# Patient Record
Sex: FEMALE | Race: WHITE | NOT HISPANIC OR LATINO
[De-identification: names, ages, dates, MRNs, and addresses within clinical notes are randomized per-mention and may not be internally consistent; named-entity substitution may affect disease eponyms.]

---

## 2018-01-09 ENCOUNTER — RESULT REVIEW (OUTPATIENT)
Age: 23
End: 2018-01-09

## 2021-03-09 ENCOUNTER — OUTPATIENT (OUTPATIENT)
Dept: OUTPATIENT SERVICES | Facility: HOSPITAL | Age: 26
LOS: 1 days | Discharge: STILL ADMITTED OR ADMINISTRATIVE | End: 2021-03-09
Payer: COMMERCIAL

## 2021-03-09 DIAGNOSIS — F29 UNSPECIFIED PSYCHOSIS NOT DUE TO A SUBSTANCE OR KNOWN PHYSIOLOGICAL CONDITION: ICD-10-CM

## 2021-03-09 DIAGNOSIS — F25.9 SCHIZOAFFECTIVE DISORDER, UNSPECIFIED: ICD-10-CM

## 2021-03-09 DIAGNOSIS — F33.9 MAJOR DEPRESSIVE DISORDER, RECURRENT, UNSPECIFIED: ICD-10-CM

## 2021-03-09 PROCEDURE — 90839 PSYTX CRISIS INITIAL 60 MIN: CPT

## 2021-03-19 PROCEDURE — 99214 OFFICE O/P EST MOD 30 MIN: CPT | Mod: 95

## 2021-03-26 PROCEDURE — 99214 OFFICE O/P EST MOD 30 MIN: CPT | Mod: 95

## 2021-03-29 ENCOUNTER — OUTPATIENT (OUTPATIENT)
Dept: OUTPATIENT SERVICES | Facility: HOSPITAL | Age: 26
LOS: 1 days | Discharge: HOME | End: 2021-03-29
Payer: MEDICAID

## 2021-03-29 PROCEDURE — 90791 PSYCH DIAGNOSTIC EVALUATION: CPT | Mod: 95

## 2021-03-30 DIAGNOSIS — F33.9 MAJOR DEPRESSIVE DISORDER, RECURRENT, UNSPECIFIED: ICD-10-CM

## 2021-04-14 PROCEDURE — 99214 OFFICE O/P EST MOD 30 MIN: CPT | Mod: 95

## 2021-05-19 PROCEDURE — 99442: CPT

## 2021-06-09 PROCEDURE — 99214 OFFICE O/P EST MOD 30 MIN: CPT | Mod: 95

## 2021-07-07 PROCEDURE — 99214 OFFICE O/P EST MOD 30 MIN: CPT | Mod: 95

## 2021-07-28 PROCEDURE — 99214 OFFICE O/P EST MOD 30 MIN: CPT | Mod: 95

## 2021-08-25 PROCEDURE — 99214 OFFICE O/P EST MOD 30 MIN: CPT | Mod: 95

## 2021-09-29 PROCEDURE — 99442: CPT

## 2021-11-04 PROCEDURE — 99214 OFFICE O/P EST MOD 30 MIN: CPT | Mod: 95

## 2021-11-30 PROCEDURE — 99214 OFFICE O/P EST MOD 30 MIN: CPT | Mod: 95

## 2022-01-07 PROCEDURE — 99443: CPT

## 2022-02-10 PROCEDURE — 99214 OFFICE O/P EST MOD 30 MIN: CPT | Mod: 95

## 2022-04-06 PROCEDURE — 99214 OFFICE O/P EST MOD 30 MIN: CPT | Mod: 95

## 2022-05-11 PROCEDURE — 99214 OFFICE O/P EST MOD 30 MIN: CPT | Mod: 95

## 2022-07-28 PROCEDURE — 99214 OFFICE O/P EST MOD 30 MIN: CPT | Mod: 95

## 2022-08-29 PROCEDURE — 99214 OFFICE O/P EST MOD 30 MIN: CPT | Mod: 95

## 2023-05-01 ENCOUNTER — OUTPATIENT (OUTPATIENT)
Dept: OUTPATIENT SERVICES | Facility: HOSPITAL | Age: 28
LOS: 1 days | Discharge: STILL ADMITTED OR ADMINISTRATIVE | End: 2023-05-01
Payer: MEDICAID

## 2023-05-01 PROCEDURE — 99214 OFFICE O/P EST MOD 30 MIN: CPT | Mod: 95

## 2023-05-02 DIAGNOSIS — F33.9 MAJOR DEPRESSIVE DISORDER, RECURRENT, UNSPECIFIED: ICD-10-CM

## 2024-11-03 ENCOUNTER — INPATIENT (INPATIENT)
Facility: HOSPITAL | Age: 29
LOS: 9 days | Discharge: HOME | End: 2024-11-13
Attending: PSYCHIATRY & NEUROLOGY | Admitting: PSYCHIATRY & NEUROLOGY
Payer: MEDICAID

## 2024-11-03 VITALS
SYSTOLIC BLOOD PRESSURE: 116 MMHG | HEART RATE: 87 BPM | RESPIRATION RATE: 16 BRPM | WEIGHT: 188.94 LBS | OXYGEN SATURATION: 100 % | TEMPERATURE: 99 F | HEIGHT: 69 IN | DIASTOLIC BLOOD PRESSURE: 73 MMHG

## 2024-11-03 DIAGNOSIS — F41.1 GENERALIZED ANXIETY DISORDER: ICD-10-CM

## 2024-11-03 DIAGNOSIS — F32.9 MAJOR DEPRESSIVE DISORDER, SINGLE EPISODE, UNSPECIFIED: ICD-10-CM

## 2024-11-03 LAB
ALBUMIN SERPL ELPH-MCNC: 4.4 G/DL — SIGNIFICANT CHANGE UP (ref 3.3–5)
ALP SERPL-CCNC: 50 U/L — SIGNIFICANT CHANGE UP (ref 40–120)
ALT FLD-CCNC: 38 U/L — HIGH (ref 4–33)
AMPHET UR-MCNC: NEGATIVE — SIGNIFICANT CHANGE UP
ANION GAP SERPL CALC-SCNC: 15 MMOL/L — HIGH (ref 7–14)
APAP SERPL-MCNC: <10 UG/ML — LOW (ref 15–25)
APPEARANCE UR: CLEAR — SIGNIFICANT CHANGE UP
AST SERPL-CCNC: 31 U/L — SIGNIFICANT CHANGE UP (ref 4–32)
BACTERIA # UR AUTO: ABNORMAL /HPF
BARBITURATES UR SCN-MCNC: NEGATIVE — SIGNIFICANT CHANGE UP
BASOPHILS # BLD AUTO: 0.07 K/UL — SIGNIFICANT CHANGE UP (ref 0–0.2)
BASOPHILS NFR BLD AUTO: 1.1 % — SIGNIFICANT CHANGE UP (ref 0–2)
BENZODIAZ UR-MCNC: NEGATIVE — SIGNIFICANT CHANGE UP
BILIRUB SERPL-MCNC: 0.3 MG/DL — SIGNIFICANT CHANGE UP (ref 0.2–1.2)
BILIRUB UR-MCNC: NEGATIVE — SIGNIFICANT CHANGE UP
BUN SERPL-MCNC: 9 MG/DL — SIGNIFICANT CHANGE UP (ref 7–23)
CALCIUM SERPL-MCNC: 8.9 MG/DL — SIGNIFICANT CHANGE UP (ref 8.4–10.5)
CAST: 0 /LPF — SIGNIFICANT CHANGE UP (ref 0–4)
CHLORIDE SERPL-SCNC: 104 MMOL/L — SIGNIFICANT CHANGE UP (ref 98–107)
CO2 SERPL-SCNC: 21 MMOL/L — LOW (ref 22–31)
COCAINE METAB.OTHER UR-MCNC: NEGATIVE — SIGNIFICANT CHANGE UP
COLOR SPEC: YELLOW — SIGNIFICANT CHANGE UP
CREAT SERPL-MCNC: 0.53 MG/DL — SIGNIFICANT CHANGE UP (ref 0.5–1.3)
CREATININE URINE RESULT, DAU: 52 MG/DL — SIGNIFICANT CHANGE UP
DIFF PNL FLD: NEGATIVE — SIGNIFICANT CHANGE UP
EGFR: 128 ML/MIN/1.73M2 — SIGNIFICANT CHANGE UP
EOSINOPHIL # BLD AUTO: 0.14 K/UL — SIGNIFICANT CHANGE UP (ref 0–0.5)
EOSINOPHIL NFR BLD AUTO: 2.2 % — SIGNIFICANT CHANGE UP (ref 0–6)
ETHANOL SERPL-MCNC: <10 MG/DL — SIGNIFICANT CHANGE UP
FENTANYL UR QL SCN: NEGATIVE — SIGNIFICANT CHANGE UP
GLUCOSE SERPL-MCNC: 84 MG/DL — SIGNIFICANT CHANGE UP (ref 70–99)
GLUCOSE UR QL: NEGATIVE MG/DL — SIGNIFICANT CHANGE UP
HCG SERPL-ACNC: <1 MIU/ML — SIGNIFICANT CHANGE UP
HCT VFR BLD CALC: 38.8 % — SIGNIFICANT CHANGE UP (ref 34.5–45)
HGB BLD-MCNC: 12.9 G/DL — SIGNIFICANT CHANGE UP (ref 11.5–15.5)
IANC: 3.38 K/UL — SIGNIFICANT CHANGE UP (ref 1.8–7.4)
IMM GRANULOCYTES NFR BLD AUTO: 0.2 % — SIGNIFICANT CHANGE UP (ref 0–0.9)
KETONES UR-MCNC: NEGATIVE MG/DL — SIGNIFICANT CHANGE UP
LEUKOCYTE ESTERASE UR-ACNC: ABNORMAL
LYMPHOCYTES # BLD AUTO: 2.16 K/UL — SIGNIFICANT CHANGE UP (ref 1–3.3)
LYMPHOCYTES # BLD AUTO: 34 % — SIGNIFICANT CHANGE UP (ref 13–44)
MCHC RBC-ENTMCNC: 28.7 PG — SIGNIFICANT CHANGE UP (ref 27–34)
MCHC RBC-ENTMCNC: 33.2 G/DL — SIGNIFICANT CHANGE UP (ref 32–36)
MCV RBC AUTO: 86.4 FL — SIGNIFICANT CHANGE UP (ref 80–100)
METHADONE UR-MCNC: NEGATIVE — SIGNIFICANT CHANGE UP
MONOCYTES # BLD AUTO: 0.59 K/UL — SIGNIFICANT CHANGE UP (ref 0–0.9)
MONOCYTES NFR BLD AUTO: 9.3 % — SIGNIFICANT CHANGE UP (ref 2–14)
NEUTROPHILS # BLD AUTO: 3.38 K/UL — SIGNIFICANT CHANGE UP (ref 1.8–7.4)
NEUTROPHILS NFR BLD AUTO: 53.2 % — SIGNIFICANT CHANGE UP (ref 43–77)
NITRITE UR-MCNC: NEGATIVE — SIGNIFICANT CHANGE UP
NRBC # BLD: 0 /100 WBCS — SIGNIFICANT CHANGE UP (ref 0–0)
NRBC # FLD: 0 K/UL — SIGNIFICANT CHANGE UP (ref 0–0)
OPIATES UR-MCNC: NEGATIVE — SIGNIFICANT CHANGE UP
OXYCODONE UR-MCNC: NEGATIVE — SIGNIFICANT CHANGE UP
PCP SPEC-MCNC: SIGNIFICANT CHANGE UP
PCP UR-MCNC: NEGATIVE — SIGNIFICANT CHANGE UP
PH UR: 7 — SIGNIFICANT CHANGE UP (ref 5–8)
PLATELET # BLD AUTO: 333 K/UL — SIGNIFICANT CHANGE UP (ref 150–400)
POTASSIUM SERPL-MCNC: 3.7 MMOL/L — SIGNIFICANT CHANGE UP (ref 3.5–5.3)
POTASSIUM SERPL-SCNC: 3.7 MMOL/L — SIGNIFICANT CHANGE UP (ref 3.5–5.3)
PROT SERPL-MCNC: 7 G/DL — SIGNIFICANT CHANGE UP (ref 6–8.3)
PROT UR-MCNC: NEGATIVE MG/DL — SIGNIFICANT CHANGE UP
RBC # BLD: 4.49 M/UL — SIGNIFICANT CHANGE UP (ref 3.8–5.2)
RBC # FLD: 11.9 % — SIGNIFICANT CHANGE UP (ref 10.3–14.5)
RBC CASTS # UR COMP ASSIST: 2 /HPF — SIGNIFICANT CHANGE UP (ref 0–4)
REVIEW: SIGNIFICANT CHANGE UP
SALICYLATES SERPL-MCNC: <0.3 MG/DL — LOW (ref 15–30)
SARS-COV-2 RNA SPEC QL NAA+PROBE: SIGNIFICANT CHANGE UP
SODIUM SERPL-SCNC: 140 MMOL/L — SIGNIFICANT CHANGE UP (ref 135–145)
SP GR SPEC: 1.01 — SIGNIFICANT CHANGE UP (ref 1–1.03)
SQUAMOUS # UR AUTO: 1 /HPF — SIGNIFICANT CHANGE UP (ref 0–5)
THC UR QL: POSITIVE
TOXICOLOGY SCREEN, DRUGS OF ABUSE, SERUM RESULT: SIGNIFICANT CHANGE UP
TSH SERPL-MCNC: 8.57 UIU/ML — HIGH (ref 0.27–4.2)
UROBILINOGEN FLD QL: 0.2 MG/DL — SIGNIFICANT CHANGE UP (ref 0.2–1)
WBC # BLD: 6.35 K/UL — SIGNIFICANT CHANGE UP (ref 3.8–10.5)
WBC # FLD AUTO: 6.35 K/UL — SIGNIFICANT CHANGE UP (ref 3.8–10.5)
WBC UR QL: 2 /HPF — SIGNIFICANT CHANGE UP (ref 0–5)

## 2024-11-03 PROCEDURE — 99285 EMERGENCY DEPT VISIT HI MDM: CPT

## 2024-11-03 NOTE — ED ADULT NURSE NOTE - NSFALLUNIVINTERV_ED_ALL_ED
Bed/Stretcher in lowest position, wheels locked, appropriate side rails in place/Call bell, personal items and telephone in reach/Instruct patient to call for assistance before getting out of bed/chair/stretcher/Non-slip footwear applied when patient is off stretcher/[place] to call system/Physically safe environment - no spills, clutter or unnecessary equipment/Purposeful proactive rounding/Room/bathroom lighting operational, light cord in reach

## 2024-11-03 NOTE — ED PROVIDER NOTE - CLINICAL SUMMARY MEDICAL DECISION MAKING FREE TEXT BOX
30 yo F PMH 30 yo F PMH MDD, HLD p/w increased delusions believing her friends are wizards and trying to hurt her and attack her. Admits to having SI related to panic attacks. Patient has been sending multiple nonstop text messages to her friend that is disorganized and rambling. Patient started an improv class and all of her friends have blocked her due to the harassing messages. Called her friend 40 times asking if her friend is dead. Admits to having racing and repetitive thoughts. Denies fever, headache, dizziness, HI/AH/VH, chills, chest pain, shortness of breath, abdominal pain, sick contact or recent travel. Denies alcohol use or other drugs.    Labs, EKG, COVID  Psych consult  Dispo as per consult

## 2024-11-03 NOTE — ED BEHAVIORAL HEALTH ASSESSMENT NOTE - DETAILS
reports suicidal ideation on Saturday, no plan or intent - passive suicidal ideation in the past father - bipolar disorder, paternal grandparent schizophrenia, mother depressed, sister anxiety mother bryson

## 2024-11-03 NOTE — ED ADULT TRIAGE NOTE - CHIEF COMPLAINT QUOTE
Patient states feeling depressed, suicidal with no plan. No homicidal ideation. Per mom, patient having hallucinations about wizards trying to harm her. History of depression. Takes Venlafaxine. Mom, Carrie Mason, [phone].

## 2024-11-03 NOTE — ED BEHAVIORAL HEALTH NOTE - BEHAVIORAL HEALTH NOTE
AS PER PSYCKES:    Diagnoses Past Year  Behavioral Health (2)  Most Recent:Major Depressive Disorder • Phobia-Agoraphobia  Most Frequent (# of services):Major Depressive Disorder(8) • Phobia-Agoraphobia(8)  Medical (9)  5 Most Recent:Acne • Rosacea • Vasomotor and allergic rhinitis • Unspecified mycosis • Conjunctivitis ...  5 Most Frequent (# of services):Acne(3) • Rosacea(3) • Cutaneous abscess, furuncle and carbuncle(2) • Vasomotor and allergic rhinitis(1) • Unspecified mycosis(1) ...    Medications Past YearLast   Venlafaxine Hcl (Venlafaxine Hcl) • Antidepressant9/27/2024Dose: 100 MG, 2/day • Quantity: 60  Cetirizine Hcl (Cetirizine Hcl) • Antihistamines - Non-Sedating7/3/2024Dose: 10 MG, 1/day • Quantity: 30  Pimecrolimus (Pimecrolimus) • Immunosuppressive Agents - Topical6/27/2024Dose: 1 %, 1/day • Quantity: 30  Spironolactone (Spironolactone) • Potassium Sparing Diuretics6/27/2024Dose: 50 MG, 1/day • Quantity: 90  Azelaic Acid (Azelaic Acid) • Rosacea Agents5/25/2024Dose: 15 %, 1.67/day • Quantity: 50    All Hospital and Crisis Utilization • 5 Years  ER Visits# ProvidersLast ER Visit  No Medicaid claims for this data type in the past 5 years  Inpatient Admissions# ProvidersLast Inpatient Admission  No Medicaid claims for this data type in the past 5 years  Crisis Services# ProvidersLast Crisis Service  No Medicaid claims for this data type in the past 5 years    Outpatient Providers Past YearLast Service Date & Type  [facility]8/15/2024Multi-Type Group (Telehealth)  [facility]6/27/2024Prescriber - [facility]5/1/2024Multi-Type Group - Internal Medicine  AGUSTO SHAHID ANN4/15/2024Physician - Obstetrics and Gynecology

## 2024-11-03 NOTE — ED ADULT NURSE NOTE - CHIEF COMPLAINT QUOTE
Patient states feeling depressed, suicidal with no plan. No homicidal ideation. Per mom, patient having hallucinations about wizards trying to harm her. History of depression. Takes Venlafaxine. Mom, Carrie Mason, [phone].

## 2024-11-03 NOTE — ED BEHAVIORAL HEALTH ASSESSMENT NOTE - SUMMARY
Patient is a 29 year old, female; domicile alone; single; noncaregiver; unemployed ; PPH of depression and anxiety; no hospitalizations; no known suicide attempts; no known history of violence or arrests; active Cannabis abuse; known history of complicated withdrawal; PMH unremarkable; brought in by mother; for psychiatric evaluation.    Patient seen and evaluated, She present depressed and anxious despite compliance with medication. Collateral from mother suggest patient was suffering from psychotic symptoms yesterday however patient does not appear psychotic at this time. Cannot rule out substance induced psychosis. Patient meets criteria for major depressive disorder and Generalized Anxiety Disorder. Discussed benefits of inpatient admission and patient is in agreement. Patient will be admitted to [facility] on a voluntary status. Patient is able to notify staff for worsening symptoms and does not require constant observation. Patient is a 29 year old, female; domicile alone; single; noncaregiver; unemployed ; PPH of depression and anxiety; no hospitalizations; no known suicide attempts; no known history of violence or arrests; active Cannabis abuse; known history of complicated withdrawal; PMH unremarkable; brought in by mother; for psychiatric evaluation.    Patient seen and evaluated, She present depressed and anxious despite compliance with medication. Collateral from mother suggest patient was suffering from psychotic symptoms yesterday however patient does not appear psychotic at this time. Cannot rule out substance induced psychosis. Patient meets criteria for major depressive disorder and Generalized Anxiety Disorder. Discussed benefits of inpatient admission and patient is in agreement. [facility] once medically cleared.  Patient is able to notify staff for worsening symptoms and does not require constant observation.   Discussed elevated TSH 8.57 with medical NP- will check T3, T4 and consult endo. Patient is a 29 year old, female; domicile alone; single; noncaregiver; unemployed ; PPH of depression and anxiety; no hospitalizations; no known suicide attempts; no known history of violence or arrests; active Cannabis abuse; known history of complicated withdrawal; PMH unremarkable; brought in by mother; for psychiatric evaluation.    Patient seen and evaluated, She present depressed and anxious despite compliance with medication. Collateral from mother suggest patient was suffering from psychotic symptoms yesterday however patient does not appear psychotic at this time. Cannot rule out substance induced psychosis. Patient meets criteria for major depressive disorder and Generalized Anxiety Disorder. Discussed benefits of inpatient admission and patient is in agreement. Patient will be admitted to [facility] on a voluntary status once medically cleared.  Patient is able to notify staff for worsening symptoms and does not require constant observation.   Discussed elevated TSH 8.57 with medical NP- will check T3 and T4. -

## 2024-11-03 NOTE — ED BEHAVIORAL HEALTH ASSESSMENT NOTE - ABORTED (SELF-INTERRUPTED) ATTEMPT:
Patient sent to [facility] for stimulant No Psych Dx. Still with extreme fatigue.   I will defer back to Rheum.  I do not have a qualifying Dx from Primary Care to utilize stimulants, and referred as recommended by staff MD.   Please contact patient for additional steps for his fatigue, and his pending results not in EPIC None known

## 2024-11-03 NOTE — ED BEHAVIORAL HEALTH ASSESSMENT NOTE - PSYCHIATRIC ISSUES AND PLAN (INCLUDE STANDING AND PRN MEDICATION)
Will defer standing treatment to treatment team, Ativan 2 mg Haldol 2 mg po/im q6h prn agitation Will defer changing standing treatment to treatment team, Ativan 2 mg po/im q6h prn agitation

## 2024-11-03 NOTE — ED PROVIDER NOTE - NSFOLLOWUPINSTRUCTIONS_ED_ALL_ED_FT
Follow up with your PMD within 48-72 hrs. Show copies of your reports given to you.   Worsening, continued or new concerning symptoms return to the emergency department.    You have been given information necessary to follow up with the  [facility] ([facility]) Crisis center & other outpatient  psychiatric clinics within your community    • [facility] walk in Crisis centre  [address] [place] [address]  [phone] [email or web address]  Hours of operation:  Day	                                        Hours  Sunday                                  Closed  Monday                                9am - 3pm  Tuesday                                9am - 3pm  Wednesday                          9am - 3pm  Thursday                               9am - 3pm  Friday                                    9am - 3pm  Saturday                                Closed

## 2024-11-03 NOTE — ED ADULT NURSE REASSESSMENT NOTE - NS ED NURSE REASSESS COMMENT FT1
Report given by previous RN, Pt breathing is equal and nonlabored. Pt is not in any distress or discomfort. pt calm and cooperative at this time. Pt safety maintained.

## 2024-11-03 NOTE — ED BEHAVIORAL HEALTH ASSESSMENT NOTE - NSBHATTESTCOMMENTATTENDFT_PSY_A_CORE
Patient is a 29 year old, female; domicile alone; single; noncaregiver; unemployed ; PPH of depression and anxiety; no hospitalizations; no known suicide attempts; no known history of violence or arrests; active Cannabis abuse; known history of complicated withdrawal; PMH unremarkable; brought in by mother; for psychiatric evaluation.    Patient presents anxious, and depressed mood, tearful at times . She denies any psychotic sx including ah/vh or paranoia. Patient is not suicidal currently, in spite of collateral. Patient is seeking voluntary admission for medications adjustment.

## 2024-11-03 NOTE — ED BEHAVIORAL HEALTH ASSESSMENT NOTE - RISK ASSESSMENT
risk- recent suicidal ideation, substance abuse  protective- supportive family, no prior attempts, futuristic, denies NSSIB

## 2024-11-03 NOTE — ED BEHAVIORAL HEALTH ASSESSMENT NOTE - DESCRIPTION
cooperative   ICU Vital Signs Last 24 Hrs  T(C): 36.7 (03 Nov 2024 19:09), Max: 37.1 (03 Nov 2024 16:56)  T(F): 98 (03 Nov 2024 19:09), Max: 98.8 (03 Nov 2024 16:56)  HR: 82 (03 Nov 2024 19:09) (82 - 87)  BP: 115/71 (03 Nov 2024 19:09) (115/71 - 116/73)  BP(mean): --  ABP: --  ABP(mean): --  RR: 18 (03 Nov 2024 19:09) (16 - 18)  SpO2: 100% (03 Nov 2024 19:09) (100% - 100%)    O2 Parameters below as of 03 Nov 2024 19:09  Patient On (Oxygen Delivery Method): room air Lives alone in [place], unemployed, parents live in [place], graduated [employer] denies

## 2024-11-03 NOTE — ED BEHAVIORAL HEALTH ASSESSMENT NOTE - NSBHATTESTAPPAMEND_PSY_A_CORE
I have personally seen and examined this patient. I fully participated in the care of this patient. I have made amendments to the documentation where appropriate and otherwise agree with the history, physical exam, and plan as documented by the SHARLA

## 2024-11-03 NOTE — ED BEHAVIORAL HEALTH ASSESSMENT NOTE - HPI (INCLUDE ILLNESS QUALITY, SEVERITY, DURATION, TIMING, CONTEXT, MODIFYING FACTORS, ASSOCIATED SIGNS AND SYMPTOMS)
Patient is a 29 year old, female; domicile alone; single; noncaregiver; unemployed ; PPH of depression and anxiety; no hospitalizations; no known suicide attempts; no known history of violence or arrests; active Cannabis abuse; known history of complicated withdrawal; PMH unremarkable; brought in by mother; for psychiatric evaluation.      Patient seen and evaluated. 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 On Saturday patient sent text messages to several friends that did make sense. Patient remembers texting her friends but does not remember what she said. Patient was told her  friends reached out to her mother and patient's mother drove to [place] and brought patient back to her parents home in [place]. Patient then went to her class today in [place] and re turned home to her apartment in [place]. Upon return patient felt extremenly anxious and depressed and agreed to come to the hospital. Current depressive symptoms include depressed mood most days of the week, anhedonia ( no longer enjoys socializing), low energy, more concentration and memory and suicidal ideation ( denies plan or intent). Patient also reports excessive anxiety and worry occurring more days than not for at least 6 months, about a number of events or activities and finds it difficult to control the worry.  Anxiety and worry are associated with restlessness', being easily fatigued, difficulty concentrating, racing thoughts. and sleep disturbances.  Patient is in current treatment at  [facility] in [place] and prescribed Venlafaxine 200 mg daily. She reports she is compliant with medication but feels it is not working. Patient reports using Cannabis daily which she feels helps her anxiety. She denies other substances of abuse.  Received collateral from mother, who dove patient to the ED. On Saturday patient was "irrational and texting messages to friends that did not make sense." Patient sister received a message from one of patients friends who was concerned. Mother drove to patient's apartment and brought her back to [place] that evening. Patient was acting bizarre with repetitive speech and not making sense. Mother suspected patient was taking mushrooms, because she has in the past, and thought the effects of the drug would be gone by the morning. This morning patient appeared " 90 percent better" and went to class. Mother went to patient apartment this evening and found patient tearful and smoking cannabis. Patient reported to mother that her instructor told her not to come back to class until she improved mentally and patients friend at school blocked her calls. Patient agreed to come to the ED. Patient has no suicide attempt however on Saturday patient stated that she wanted to die. As far as other psychiatric symptoms, patient reports feeling paranoid with thoughts that random people are out to get her and over the past several days has felt unsafe when she goes outside. Patient denies current or recent auditory/visual hallucinations and denies ideas of reference. Patient also denies Patient is a 29 year old, female; domicile alone; single; noncaregiver; unemployed ; PPH of depression and anxiety; no hospitalizations; no known suicide attempts; no known history of violence or arrests; active Cannabis abuse; known history of complicated withdrawal; PMH unremarkable; brought in by mother; for psychiatric evaluation.      Patient seen and evaluated. 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 On Saturday patient sent text messages to several friends that did make sense. Patient remembers texting her friends but does not remember what she said. Patient was told her  friends reached out to her mother and patient's mother drove to [place] and brought patient back to her parents home in [place]. Patient then went to her class today in [place] and re turned home to her apartment in [place]. Upon return patient felt extremely anxious and depressed and agreed to come to the hospital. Current depressive symptoms include depressed mood most days of the week, anhedonia ( no longer enjoys socializing), low energy, more concentration and memory and suicidal ideation ( denies plan or intent). Patient also reports excessive anxiety and worry occurring more days than not for at least 6 months, about a number of events or activities and finds it difficult to control the worry.  Anxiety and worry are associated with restlessness', being easily fatigued, difficulty concentrating, racing thoughts. and sleep disturbances.  Patient is in current treatment at  [facility] in [place] and prescribed Venlafaxine 200 mg daily. She reports she is compliant with medication but feels it is not working. Patient reports using Cannabis daily which she feels helps her anxiety. She denies other substances of abuse.  Received collateral from mother, who dove patient to the ED. On Saturday patient was "irrational and texting messages to friends that did not make sense." Patient sister received a message from one of patients friends who was concerned. Mother drove to patient's apartment and brought her back to [place] that evening. Patient was acting bizarre with repetitive speech and not making sense. Mother suspected patient was taking mushrooms, because she has in the past, and thought the effects of the drug would be gone by the morning. This morning patient appeared " 90 percent better" and went to class. Mother went to patient apartment this evening and found patient tearful and smoking cannabis. Patient reported to mother that her instructor told her not to come back to class until she improved mentally and patients friend at school blocked her calls. Patient agreed to come to the ED. Patient has no suicide attempt however on Saturday patient stated that she wanted to die. As far as other psychiatric symptoms, patient reports feeling paranoid with thoughts that random people are out to get her and over the past several days has felt unsafe when she goes outside. Patient denies current or recent auditory/visual hallucinations and denies ideas of reference. Patient also denies Patient is a 29 year old, female; domicile alone; single; noncaregiver; unemployed ; PPH of depression and anxiety; no hospitalizations; no known suicide attempts; no known history of violence or arrests; active Cannabis abuse; known history of complicated withdrawal; PMH unremarkable; brought in by mother; for psychiatric evaluation.      Patient seen and evaluated. 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 On Saturday patient sent text messages to several friends that didn't make sense. Patient remembers texting her friends but does not remember what she said. Patient was told her  friends reached out to her mother and patient's mother drove to [place] and brought patient back to her parents home in [place]. Patient then went to her class today in [place] and re turned home to her apartment in [place]. Upon return patient felt extremely anxious and depressed and agreed to come to the hospital. Current depressive symptoms include depressed mood most days of the week, anhedonia ( no longer enjoys socializing), low energy, more concentration and memory and suicidal ideation ( denies plan or intent). Patient also reports excessive anxiety and worry occurring more days than not for at least 6 months, about a number of events or activities and finds it difficult to control the worry.  Anxiety and worry are associated with restlessness', being easily fatigued, difficulty concentrating, racing thoughts. and sleep disturbances.  Patient is in current treatment at  [facility] in [place] and prescribed Venlafaxine 200 mg daily. She reports she is compliant with medication but feels it is not working. Patient reports using Cannabis daily which she feels helps her anxiety. She denies other substances of abuse.  Received collateral from mother, who dove patient to the ED. On Saturday patient was "irrational and texting messages to friends that did not make sense." Patient sister received a message from one of patients friends who was concerned. Mother drove to patient's apartment and brought her back to [place] that evening. Patient was acting bizarre with repetitive speech and not making sense. Mother suspected patient was taking mushrooms, because she has in the past, and thought the effects of the drug would be gone by the morning. This morning patient appeared " 90 percent better" and went to class. Mother went to patient apartment this evening and found patient tearful and smoking cannabis. Patient reported to mother that her instructor told her not to come back to class until she improved mentally and patients friend at school blocked her calls. Patient agreed to come to the ED. Patient has no suicide attempt however on Saturday patient stated that she wanted to die. As far as other psychiatric symptoms, patient reports feeling paranoid with thoughts that random people are out to get her and over the past several days has felt unsafe when she goes outside. Patient denies current or recent auditory/visual hallucinations and denies ideas of reference. Patient also denies

## 2024-11-03 NOTE — ED ADULT NURSE NOTE - OBJECTIVE STATEMENT
BREAK RN: pt. received to  from walk in triage brought in by mother presenting for a psych eval. As per triage note, pt. has been endorsing S/I with no plan, as well as auditory hallucinations of wizards telling her what to do. Upon initial interview, pt. states she feels "fine", has been endorsing panic attacks worsening over x1.5 months, as well as sending "strange" texts to her friends that she does not remember sending. pt. maintains eye contact upon interview. pt. calm, cooperative, rediretable but tearful. Denies S/I and H/I, A/H and V/H, ETOH/Illicit Drug use upon initial interview. Pt. belongings secured. pt. wanded for safety. pt. independently changed into  Clothing. eval on going at this time.

## 2024-11-03 NOTE — ED PROVIDER NOTE - CARE PLAN
1 Principal Discharge DX:	MDD (major depressive disorder)  Secondary Diagnosis:	ARMANDO (generalized anxiety disorder)

## 2024-11-04 DIAGNOSIS — F32.9 MAJOR DEPRESSIVE DISORDER, SINGLE EPISODE, UNSPECIFIED: ICD-10-CM

## 2024-11-04 LAB
A1C WITH ESTIMATED AVERAGE GLUCOSE RESULT: 5.9 % — HIGH (ref 4–5.6)
ADD ON TEST-SPECIMEN IN LAB: SIGNIFICANT CHANGE UP
ESTIMATED AVERAGE GLUCOSE: 123 — SIGNIFICANT CHANGE UP

## 2024-11-04 PROCEDURE — 99233 SBSQ HOSP IP/OBS HIGH 50: CPT

## 2024-11-04 RX ORDER — INFLUENZ VIR VAC TV P-SURF2003 15MCG/.5ML
0.5 SYRINGE (ML) INTRAMUSCULAR ONCE
Refills: 0 | Status: COMPLETED | OUTPATIENT
Start: 2024-11-04 | End: 2024-11-04

## 2024-11-04 RX ORDER — ACETAMINOPHEN 500 MG
650 TABLET ORAL EVERY 6 HOURS
Refills: 0 | Status: DISCONTINUED | OUTPATIENT
Start: 2024-11-04 | End: 2024-11-13

## 2024-11-04 RX ORDER — LITHIUM CARBONATE 300 MG
300 CAPSULE ORAL
Refills: 0 | Status: DISCONTINUED | OUTPATIENT
Start: 2024-11-04 | End: 2024-11-08

## 2024-11-04 RX ORDER — MELATONIN 5 MG
5 TABLET ORAL AT BEDTIME
Refills: 0 | Status: DISCONTINUED | OUTPATIENT
Start: 2024-11-04 | End: 2024-11-13

## 2024-11-04 RX ORDER — VENLAFAXINE HCL 150 MG
100 CAPSULE, EXT RELEASE 24 HR ORAL
Refills: 0 | Status: DISCONTINUED | OUTPATIENT
Start: 2024-11-04 | End: 2024-11-13

## 2024-11-04 RX ORDER — MAGNESIUM, ALUMINUM HYDROXIDE 200-200 MG
30 TABLET,CHEWABLE ORAL EVERY 4 HOURS
Refills: 0 | Status: DISCONTINUED | OUTPATIENT
Start: 2024-11-04 | End: 2024-11-13

## 2024-11-04 RX ORDER — LORAZEPAM 2 MG
2 TABLET ORAL ONCE
Refills: 0 | Status: DISCONTINUED | OUTPATIENT
Start: 2024-11-04 | End: 2024-11-11

## 2024-11-04 RX ORDER — LORAZEPAM 2 MG
2 TABLET ORAL EVERY 6 HOURS
Refills: 0 | Status: DISCONTINUED | OUTPATIENT
Start: 2024-11-04 | End: 2024-11-04

## 2024-11-04 RX ORDER — RISPERIDONE 3 MG/1
1 TABLET, FILM COATED ORAL AT BEDTIME
Refills: 0 | Status: DISCONTINUED | OUTPATIENT
Start: 2024-11-04 | End: 2024-11-13

## 2024-11-04 RX ADMIN — Medication 100 MILLIGRAM(S): at 15:41

## 2024-11-04 RX ADMIN — Medication 0.5 MILLILITER(S): at 12:34

## 2024-11-04 RX ADMIN — RISPERIDONE 1 MILLIGRAM(S): 3 TABLET, FILM COATED ORAL at 20:51

## 2024-11-04 RX ADMIN — Medication 100 MILLIGRAM(S): at 09:06

## 2024-11-04 RX ADMIN — Medication 300 MILLIGRAM(S): at 20:51

## 2024-11-04 NOTE — BH INPATIENT PSYCHIATRY ASSESSMENT NOTE - DETAILS
father - bipolar disorder, paternal grandparent schizophrenia, mother depressed, sister anxiety reports suicidal ideation on Saturday, no plan or intent - passive suicidal ideation in the past

## 2024-11-04 NOTE — BH PATIENT PROFILE - FALL HARM RISK - UNIVERSAL INTERVENTIONS
Bed in lowest position, wheels locked, appropriate side rails in place/Call bell, personal items and telephone in reach/Instruct patient to call for assistance before getting out of bed or chair/Non-slip footwear when patient is out of bed/[place] to call system/Physically safe environment - no spills, clutter or unnecessary equipment/Purposeful Proactive Rounding/Room/bathroom lighting operational, light cord in reach

## 2024-11-04 NOTE — BH PATIENT PROFILE - HOSPITALS/PSYCHIATRIC FACILITIES
Pt requested lab results via  # [id number]  Please review and advise  Thank you!
[facility]

## 2024-11-04 NOTE — BH INPATIENT PSYCHIATRY ASSESSMENT NOTE - NSBHCHARTREVIEWVS_PSY_A_CORE FT
Vital Signs Last 24 Hrs  T(C): 36.5 (11-04-24 @ 08:45), Max: 37.1 (11-03-24 @ 16:56)  T(F): 97.7 (11-04-24 @ 08:45), Max: 98.8 (11-03-24 @ 16:56)  HR: 85 (11-03-24 @ 23:48) (82 - 87)  BP: 115/76 (11-03-24 @ 23:48) (115/71 - 116/73)  BP(mean): --  RR: 18 (11-04-24 @ 01:53) (16 - 18)  SpO2: 100% (11-04-24 @ 01:53) (97% - 100%)    Orthostatic VS  11-04-24 @ 08:45  Lying BP: --/-- HR: --  Sitting BP: 124/73 HR: 90  Standing BP: 115/67 HR: 97  Site: --  Mode: --  Orthostatic VS  11-04-24 @ 01:53  Lying BP: --/-- HR: --  Sitting BP: 120/70 HR: 87  Standing BP: 117/66 HR: 96  Site: --  Mode: --   Vital Signs Last 24 Hrs  T(C): 36.2 (11-05-24 @ 07:48), Max: 36.2 (11-05-24 @ 07:48)  T(F): 97.1 (11-05-24 @ 07:48), Max: 97.1 (11-05-24 @ 07:48)  HR: --  BP: --  BP(mean): --  RR: 18 (11-05-24 @ 07:48) (18 - 18)  SpO2: --    Orthostatic VS  11-05-24 @ 07:48  Lying BP: --/-- HR: --  Sitting BP: 115/69 HR: 89  Standing BP: 111/64 HR: 98  Site: --  Mode: --  Orthostatic VS  11-04-24 @ 08:45  Lying BP: --/-- HR: --  Sitting BP: 124/73 HR: 90  Standing BP: 115/67 HR: 97  Site: --  Mode: --  Orthostatic VS  11-04-24 @ 01:53  Lying BP: --/-- HR: --  Sitting BP: 120/70 HR: 87  Standing BP: 117/66 HR: 96  Site: --  Mode: --

## 2024-11-04 NOTE — BH PATIENT PROFILE - NSTOBACCOCESSATIONEDU2_GEN_A_NUR
no Develop coping skills.  For example, strategies and lifestyle changes that reduce negative moods, stress, and exposure to smoking cues.

## 2024-11-04 NOTE — BH INPATIENT PSYCHIATRY ASSESSMENT NOTE - NSBHMETABOLIC_PSY_ALL_CORE_FT
BMI: BMI (kg/m2): 29.4 (11-04-24 @ 01:53)  HbA1c: A1C with Estimated Average Glucose Result: 5.9 % (11-04-24 @ 08:00)    Glucose:   BP: 115/76 (11-03-24 @ 23:48) (115/71 - 116/73)Vital Signs Last 24 Hrs  T(C): 36.5 (11-04-24 @ 08:45), Max: 37.1 (11-03-24 @ 16:56)  T(F): 97.7 (11-04-24 @ 08:45), Max: 98.8 (11-03-24 @ 16:56)  HR: 85 (11-03-24 @ 23:48) (82 - 87)  BP: 115/76 (11-03-24 @ 23:48) (115/71 - 116/73)  BP(mean): --  RR: 18 (11-04-24 @ 01:53) (16 - 18)  SpO2: 100% (11-04-24 @ 01:53) (97% - 100%)    Orthostatic VS  11-04-24 @ 08:45  Lying BP: --/-- HR: --  Sitting BP: 124/73 HR: 90  Standing BP: 115/67 HR: 97  Site: --  Mode: --  Orthostatic VS  11-04-24 @ 01:53  Lying BP: --/-- HR: --  Sitting BP: 120/70 HR: 87  Standing BP: 117/66 HR: 96  Site: --  Mode: --    Lipid Panel:  BMI: BMI (kg/m2): 29.4 (11-04-24 @ 01:53)  HbA1c: A1C with Estimated Average Glucose Result: 5.9 % (11-04-24 @ 08:00)    Glucose:   BP: 115/76 (11-03-24 @ 23:48) (115/71 - 116/73)Vital Signs Last 24 Hrs  T(C): 36.2 (11-05-24 @ 07:48), Max: 36.2 (11-05-24 @ 07:48)  T(F): 97.1 (11-05-24 @ 07:48), Max: 97.1 (11-05-24 @ 07:48)  HR: --  BP: --  BP(mean): --  RR: 18 (11-05-24 @ 07:48) (18 - 18)  SpO2: --    Orthostatic VS  11-05-24 @ 07:48  Lying BP: --/-- HR: --  Sitting BP: 115/69 HR: 89  Standing BP: 111/64 HR: 98  Site: --  Mode: --  Orthostatic VS  11-04-24 @ 08:45  Lying BP: --/-- HR: --  Sitting BP: 124/73 HR: 90  Standing BP: 115/67 HR: 97  Site: --  Mode: --  Orthostatic VS  11-04-24 @ 01:53  Lying BP: --/-- HR: --  Sitting BP: 120/70 HR: 87  Standing BP: 117/66 HR: 96  Site: --  Mode: --    Lipid Panel: Date/Time: 11-05-24 @ 09:15  Cholesterol, Serum: 211  LDL Cholesterol Calculated: 144  HDL Cholesterol, Serum: 48  Total Cholesterol/HDL Ration Measurement: --  Triglycerides, Serum: 95

## 2024-11-04 NOTE — BH INPATIENT PSYCHIATRY ASSESSMENT NOTE - NSBHASSESSSUMMFT_PSY_ALL_CORE
Patient is a 28 y/o woman, singe with no children, she is recently unemployed and is supported by her family; that was admitted to the psychiatric inpatient unit due to disorganized bizarre behaviors in the context of multiple psychosocial stressors and recent cannabis use.   Patient presented during the assessment calm and cooperative. She is a fairly organized and a good historian. Patient reported that a month ago she had to quit her job due to the stress and not been able to cope with it. 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 Patient said that after quitting the job she was more withdrawn, depressed, anxious and isolative. She started writing about when she job sick 3 years ago and she was in to that head space. Patient tells me that she got paranoid, was not sleeping and felt anxious all the time. Patient described her paranoia as feel that people have not the best interest in her, people don't want her to succeed and she cannot trust her friends. Patient denies experiencing any hallucinations and denies SI. Patient has been following with an outpatient psychiatrist in [facility] but they only do telehealth and it was difficult to have her medications adjusted. She doesn't know why she is only on the Effexor.   Patient tells me that when she was 26 she had a similar episode but ended up been fired due to her behaviors at work. She saw a psychiatrist at the time that diagnosed her with Bipolar Disorder and put her on multiple medications. She got worst and ended up coming to the [facility] crisis clinic and she had her medications adjusted but she doesn't know the names of this.  Patient informed me that her father has Bipolar Disorder and her mother has MDD. At this time patient is open to have her medications adjusted and is in agreement with the admission.     Plan:  1. Patient admitted under a voluntary status.  2. Q20 min checks   3. Medications   Continue Effexor 100mg BID  Start Risperdal 1mg at bedtime   4. Blood work reviewed TSH is elevated; I have call the medical team to address this.  5. Sw to coordinate a safe discharge plan.   Patient is a 30 y/o woman, singe with no children, she is recently unemployed and is supported by her family; that was admitted to the psychiatric inpatient unit due to disorganized bizarre behaviors in the context of multiple psychosocial stressors and recent cannabis use.   Patient presented during the assessment calm and cooperative. She is a fairly organized and a good historian. Patient reported that a month ago she had to quit her job due to the stress and not been able to cope with it. 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 Patient said that after quitting the job she was more withdrawn, depressed, anxious and isolative. She started writing about when she job sick 3 years ago and she was in to that head space. Patient tells me that she got paranoid, was not sleeping and felt anxious all the time. Patient described her paranoia as feel that people have not the best interest in her, people don't want her to succeed and she cannot trust her friends. Patient denies experiencing any hallucinations and denies SI. Patient has been following with an outpatient psychiatrist in [facility] but they only do telehealth and it was difficult to have her medications adjusted. She doesn't know why she is only on the Effexor.   Patient tells me that when she was 26 she had a similar episode but ended up been fired due to her behaviors at work. She saw a psychiatrist at the time that diagnosed her with Bipolar Disorder and put her on multiple medications. She got worst and ended up coming to the [facility] crisis clinic and she had her medications adjusted but she doesn't know the names of this.  Patient informed me that her father has Bipolar Disorder and her mother has MDD. At this time patient is open to have her medications adjusted and is in agreement with the admission.     Plan:  1. Patient admitted under a voluntary status.  2. Q20 min checks   3. Medications   Continue Effexor 100mg BID  Start Risperdal 1mg at bedtime   Start Lithium 300mg BID   4. Blood work reviewed TSH is elevated; I have call the medical team to address this. Is ok to start Lithium. HbgA1C 5.9 as per medical team we just need to monitor this.  5. Sw to coordinate a safe discharge plan.

## 2024-11-04 NOTE — BH INPATIENT PSYCHIATRY ASSESSMENT NOTE - NSBHATTESTBILLING_PSY_A_CORE
91550-Jynamritcj OBS or IP - high complexity OR 50-79 mins 99223-Initial OBS or IP - high complexity OR  mins

## 2024-11-04 NOTE — BH INPATIENT PSYCHIATRY ASSESSMENT NOTE - HPI (INCLUDE ILLNESS QUALITY, SEVERITY, DURATION, TIMING, CONTEXT, MODIFYING FACTORS, ASSOCIATED SIGNS AND SYMPTOMS)
Patient is a 29 year old, female; domicile alone; single; noncaregiver; unemployed ; PPH of depression and anxiety; no hospitalizations; no known suicide attempts; no known history of violence or arrests; active Cannabis abuse; known history of complicated withdrawal; PMH unremarkable; brought in by mother; for psychiatric evaluation.      Patient seen and evaluated. 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 On Saturday patient sent text messages to several friends that did make sense. Patient remembers texting her friends but does not remember what she said. Patient was told her  friends reached out to her mother and patient's mother drove to [place] and brought patient back to her parents home in [place]. Patient then went to her class today in [place] and re turned home to her apartment in [place]. Upon return patient felt extremely anxious and depressed and agreed to come to the hospital. Current depressive symptoms include depressed mood most days of the week, anhedonia ( no longer enjoys socializing), low energy, more concentration and memory and suicidal ideation ( denies plan or intent). Patient also reports excessive anxiety and worry occurring more days than not for at least 6 months, about a number of events or activities and finds it difficult to control the worry.  Anxiety and worry are associated with restlessness', being easily fatigued, difficulty concentrating, racing thoughts. and sleep disturbances.  Patient is in current treatment at  [facility] in [place] and prescribed Venlafaxine 200 mg daily. She reports she is compliant with medication but feels it is not working. Patient reports using Cannabis daily which she feels helps her anxiety. She denies other substances of abuse.  Received collateral from mother, who dove patient to the ED. On Saturday patient was "irrational and texting messages to friends that did not make sense." Patient sister received a message from one of patients friends who was concerned. Mother drove to patient's apartment and brought her back to [place] that evening. Patient was acting bizarre with repetitive speech and not making sense. Mother suspected patient was taking mushrooms, because she has in the past, and thought the effects of the drug would be gone by the morning. This morning patient appeared " 90 percent better" and went to class. Mother went to patient apartment this evening and found patient tearful and smoking cannabis. Patient reported to mother that her instructor told her not to come back to class until she improved mentally and patients friend at school blocked her calls. Patient agreed to come to the ED. Patient has no suicide attempt however on Saturday patient stated that she wanted to die. As far as other psychiatric symptoms, patient reports feeling paranoid with thoughts that random people are out to get her and over the past several days has felt unsafe when she goes outside. Patient denies current or recent auditory/visual hallucinations and denies ideas of reference. Patient also denies As per ER Notes:    Patient is a 29 year old, female; domicile alone; single; noncaregiver; unemployed ; PPH of depression and anxiety; no hospitalizations; no known suicide attempts; no known history of violence or arrests; active Cannabis abuse; known history of complicated withdrawal; PMH unremarkable; brought in by mother; for psychiatric evaluation.      Patient seen and evaluated. 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 On Saturday patient sent text messages to several friends that did make sense. Patient remembers texting her friends but does not remember what she said. Patient was told her  friends reached out to her mother and patient's mother drove to [place] and brought patient back to her parents home in [place]. Patient then went to her class today in [place] and re turned home to her apartment in [place]. Upon return patient felt extremely anxious and depressed and agreed to come to the hospital. Current depressive symptoms include depressed mood most days of the week, anhedonia ( no longer enjoys socializing), low energy, more concentration and memory and suicidal ideation ( denies plan or intent). Patient also reports excessive anxiety and worry occurring more days than not for at least 6 months, about a number of events or activities and finds it difficult to control the worry.  Anxiety and worry are associated with restlessness', being easily fatigued, difficulty concentrating, racing thoughts. and sleep disturbances.  Patient is in current treatment at  [facility] in [place] and prescribed Venlafaxine 200 mg daily. She reports she is compliant with medication but feels it is not working. Patient reports using Cannabis daily which she feels helps her anxiety. She denies other substances of abuse.  Received collateral from mother, who dove patient to the ED. On Saturday patient was "irrational and texting messages to friends that did not make sense." Patient sister received a message from one of patients friends who was concerned. Mother drove to patient's apartment and brought her back to [place] that evening. Patient was acting bizarre with repetitive speech and not making sense. Mother suspected patient was taking mushrooms, because she has in the past, and thought the effects of the drug would be gone by the morning. This morning patient appeared " 90 percent better" and went to class. Mother went to patient apartment this evening and found patient tearful and smoking cannabis. Patient reported to mother that her instructor told her not to come back to class until she improved mentally and patients friend at school blocked her calls. Patient agreed to come to the ED. Patient has no suicide attempt however on Saturday patient stated that she wanted to die. As far as other psychiatric symptoms, patient reports feeling paranoid with thoughts that random people are out to get her and over the past several days has felt unsafe when she goes outside. Patient denies current or recent auditory/visual hallucinations and denies ideas of reference. Patient also denies

## 2024-11-04 NOTE — BH INPATIENT PSYCHIATRY ASSESSMENT NOTE - CURRENT MEDICATION
MEDICATIONS  (STANDING):  influenza   Vaccine 0.5 milliLiter(s) IntraMuscular once  venlafaxine 100 milliGRAM(s) Oral two times a day with meals    MEDICATIONS  (PRN):  LORazepam     Tablet 2 milliGRAM(s) Oral every 6 hours PRN Agitation  LORazepam   Injectable 2 milliGRAM(s) IntraMuscular Once PRN Agitation   MEDICATIONS  (STANDING):  risperiDONE   Tablet 1 milliGRAM(s) Oral at bedtime  venlafaxine 100 milliGRAM(s) Oral two times a day with meals    MEDICATIONS  (PRN):  acetaminophen     Tablet .. 650 milliGRAM(s) Oral every 6 hours PRN Mild Pain (1 - 3)  aluminum hydroxide/magnesium hydroxide/simethicone Suspension 30 milliLiter(s) Oral every 4 hours PRN Dyspepsia  LORazepam     Tablet 2 milliGRAM(s) Oral every 6 hours PRN Agitation  LORazepam   Injectable 2 milliGRAM(s) IntraMuscular Once PRN Agitation  melatonin. 5 milliGRAM(s) Oral at bedtime PRN Insomnia   MEDICATIONS  (STANDING):  lithium 300 milliGRAM(s) Oral two times a day  risperiDONE   Tablet 1 milliGRAM(s) Oral at bedtime  venlafaxine 100 milliGRAM(s) Oral two times a day with meals    MEDICATIONS  (PRN):  acetaminophen     Tablet .. 650 milliGRAM(s) Oral every 6 hours PRN Mild Pain (1 - 3)  aluminum hydroxide/magnesium hydroxide/simethicone Suspension 30 milliLiter(s) Oral every 4 hours PRN Dyspepsia  LORazepam     Tablet 2 milliGRAM(s) Oral every 6 hours PRN Agitation  LORazepam   Injectable 2 milliGRAM(s) IntraMuscular Once PRN Agitation  melatonin. 5 milliGRAM(s) Oral at bedtime PRN Insomnia

## 2024-11-04 NOTE — PSYCHIATRIC REHAB INITIAL EVALUATION - NSBHPRRECOMMEND_PSY_ALL_CORE
Writer met with Pt to orient her to Psych Rehab department and its functions. Pt was receptive, engaged, pleasant, cooperative but tearful during the session. Pt identified her primary reason of hospitalization as worsening of her depression and anxiety and at times she felt confused. Suryar was able to establish a collaborative goal with Pt to work on the next seven days and encouraged her to attend groups. Pt was receptive. Psychiatric Rehabilitation staff will continue to provide encouragement, support, psychotherapy, and psychoeducation to assist the Pt in the progression of her treatment goal and engagement with activities.

## 2024-11-05 LAB
CHOLEST SERPL-MCNC: 211 MG/DL — HIGH
HDLC SERPL-MCNC: 48 MG/DL — LOW
LIPID PNL WITH DIRECT LDL SERPL: 144 MG/DL — HIGH
NON HDL CHOLESTEROL: 163 MG/DL — HIGH
TRIGL SERPL-MCNC: 95 MG/DL — SIGNIFICANT CHANGE UP

## 2024-11-05 PROCEDURE — 99232 SBSQ HOSP IP/OBS MODERATE 35: CPT

## 2024-11-05 RX ADMIN — RISPERIDONE 1 MILLIGRAM(S): 3 TABLET, FILM COATED ORAL at 21:16

## 2024-11-05 RX ADMIN — Medication 300 MILLIGRAM(S): at 21:16

## 2024-11-05 RX ADMIN — Medication 100 MILLIGRAM(S): at 09:04

## 2024-11-05 RX ADMIN — Medication 300 MILLIGRAM(S): at 09:04

## 2024-11-05 RX ADMIN — Medication 100 MILLIGRAM(S): at 16:11

## 2024-11-05 NOTE — BH INPATIENT PSYCHIATRY PROGRESS NOTE - NSBHFUPINTERVALHXFT_PSY_A_CORE
Patient has been discuss with the nursing staff. Patient slept most of the night, she has been attending groups and seemed to be in good behavioral control . Patient reported that she is feeling safe in the hospital contrary to how she was feeling at home. She is optimistic about her treatment and would like to continue following at one of our outpatient clinics. Patient denies any jerrell effects from the medications. She was educated about the signs and symptoms of Lithium toxicity, limited use of caffeine while she is on lithium, she needs to avoid the use of ibuprofen and keep her self hydrated. Patient denies feeling paranoid and denies any SI.

## 2024-11-05 NOTE — BH INPATIENT PSYCHIATRY PROGRESS NOTE - NSBHASSESSSUMMFT_PSY_ALL_CORE
Patient is a 30 y/o woman, singe with no children, she is recently unemployed and is supported by her family; that was admitted to the psychiatric inpatient unit due to disorganized bizarre behaviors in the context of multiple psychosocial stressors and recent cannabis use.   Patient presented during the assessment calm and cooperative. She is a fairly organized and a good historian. Patient reported that a month ago she had to quit her job due to the stress and not been able to cope with it. 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 Patient said that after quitting the job she was more withdrawn, depressed, anxious and isolative. She started writing about when she job sick 3 years ago and she was in to that head space. Patient tells me that she got paranoid, was not sleeping and felt anxious all the time. Patient described her paranoia as feel that people have not the best interest in her, people don't want her to succeed and she cannot trust her friends. Patient denies experiencing any hallucinations and denies SI. Patient has been following with an [facility] [place] [facility] but they only do telehealth and it was difficult to have her medications adjusted. She doesn't know why she is only on the Effexor.   Patient tells me that when she was 26 she had a similar episode but ended up been fired due to her behaviors at work. She saw a psychiatrist at the time that diagnosed her with Bipolar Disorder and put her on multiple medications. She got worst and ended up coming to the [facility] crisis clinic and she had her medications adjusted but she doesn't know the names of this.  Patient informed me that her father has Bipolar Disorder and her mother has MDD. At this time patient is open to have her medications adjusted and is in agreement with the admission.     Plan:  1. Patient admitted under a voluntary status.  2. Q20 min checks   3. Medications   Continue Effexor 100mg BID  Start Risperdal 1mg at bedtime   Start Lithium 300mg BID   4. Blood work reviewed TSH is elevated; I have call the medical team to address this. Is ok to start Lithium. HbgA1C 5.9 as per medical team we just need to monitor this.  5. Sw to coordinate a safe discharge plan.

## 2024-11-06 PROCEDURE — 99232 SBSQ HOSP IP/OBS MODERATE 35: CPT

## 2024-11-06 RX ADMIN — Medication 300 MILLIGRAM(S): at 08:58

## 2024-11-06 RX ADMIN — Medication 100 MILLIGRAM(S): at 16:32

## 2024-11-06 RX ADMIN — RISPERIDONE 1 MILLIGRAM(S): 3 TABLET, FILM COATED ORAL at 20:18

## 2024-11-06 RX ADMIN — Medication 100 MILLIGRAM(S): at 08:58

## 2024-11-06 RX ADMIN — Medication 300 MILLIGRAM(S): at 20:18

## 2024-11-06 NOTE — BH TREATMENT PLAN - NSTXDEPRESINTERRN_PSY_ALL_CORE
patient encouraged to seek staff support when needed.   patient encouraged to attend groups
patient encouraged to seek staff support when needed.   patient encouraged to attend groups

## 2024-11-06 NOTE — BH TREATMENT PLAN - NSCMSPTSTRENGTHS_PSY_ALL_CORE
Intact family/Strong support system/Supportive family
Intact family/Strong support system/Supportive family

## 2024-11-06 NOTE — BH INPATIENT PSYCHIATRY PROGRESS NOTE - NSBHFUPINTERVALHXFT_PSY_A_CORE
Patient has been discuss with the nursing staff. Patient reported that she is doing fine but she is feeling disappointed with the results of the election. She said that she live in a bubble and been here has made her interact with people that think differently. She understands that she needs to see things from other peoples views. I discuss with the patient that she needs to focus on her self at this time. Patient denies any side effects from the medications and she is in agreement with a referral to the Bipolar Clinic. Denies any SI and has been able to contract for safety.

## 2024-11-06 NOTE — BH TREATMENT PLAN - NSTXDEPRESINTERPR_PSY_ALL_CORE
Psychiatric Rehabilitation staff will continue to provide encouragement, support, psychotherapy, and psychoeducation to assist the Pt in the progression of her treatment goal and engagement with activities.
Psychiatric Rehabilitation staff will continue to provide encouragement, support, psychotherapy, and psychoeducation to assist the Pt in the progression of her treatment goal and engagement with activities.

## 2024-11-06 NOTE — BH INPATIENT PSYCHIATRY PROGRESS NOTE - NSBHASSESSSUMMFT_PSY_ALL_CORE
Patient is a 30 y/o woman, singe with no children, she is recently unemployed and is supported by her family; that was admitted to the psychiatric inpatient unit due to disorganized bizarre behaviors in the context of multiple psychosocial stressors and recent cannabis use.   Patient presented during the assessment calm and cooperative. She is a fairly organized and a good historian. Patient reported that a month ago she had to quit her job due to the stress and not been able to cope with it. 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 Patient said that after quitting the job she was more withdrawn, depressed, anxious and isolative. She started writing about when she job sick 3 years ago and she was in to that head space. Patient tells me that she got paranoid, was not sleeping and felt anxious all the time. Patient described her paranoia as feel that people have not the best interest in her, people don't want her to succeed and she cannot trust her friends. Patient denies experiencing any hallucinations and denies SI. Patient has been following with an outpatient psychiatrist in [facility] but they only do telehealth and it was difficult to have her medications adjusted. She doesn't know why she is only on the Effexor.   Patient tells me that when she was 26 she had a similar episode but ended up been fired due to her behaviors at work. She saw a psychiatrist at the time that diagnosed her with Bipolar Disorder and put her on multiple medications. She got worst and ended up coming to the [facility] crisis clinic and she had her medications adjusted but she doesn't know the names of this.  Patient informed me that her father has Bipolar Disorder and her mother has MDD. At this time patient is open to have her medications adjusted and is in agreement with the admission.     Plan:  1. Patient admitted under a voluntary status.  2. Q20 min checks   3. Medications   Continue Effexor 100mg BID  Start Risperdal 1mg at bedtime   Start Lithium 300mg BID   4. Blood work reviewed TSH is elevated; I have call the medical team to address this. Is ok to start Lithium. HbgA1C 5.9 as per medical team we just need to monitor this.  5. Sw to coordinate a safe discharge plan.

## 2024-11-07 PROCEDURE — 99232 SBSQ HOSP IP/OBS MODERATE 35: CPT

## 2024-11-07 RX ORDER — POLYETHYLENE GLYCOL 3350 17 G/17G
17 POWDER, FOR SOLUTION ORAL AT BEDTIME
Refills: 0 | Status: DISCONTINUED | OUTPATIENT
Start: 2024-11-07 | End: 2024-11-13

## 2024-11-07 RX ORDER — OXYMETAZOLINE HCL 0.05 %
1 SPRAY, NON-AEROSOL (ML) NASAL EVERY 12 HOURS
Refills: 0 | Status: DISCONTINUED | OUTPATIENT
Start: 2024-11-07 | End: 2024-11-13

## 2024-11-07 RX ADMIN — Medication 100 MILLIGRAM(S): at 17:11

## 2024-11-07 RX ADMIN — Medication 300 MILLIGRAM(S): at 21:09

## 2024-11-07 RX ADMIN — Medication 100 MILLIGRAM(S): at 09:48

## 2024-11-07 RX ADMIN — RISPERIDONE 1 MILLIGRAM(S): 3 TABLET, FILM COATED ORAL at 21:08

## 2024-11-07 RX ADMIN — Medication 1 SPRAY(S): at 21:49

## 2024-11-07 RX ADMIN — Medication 300 MILLIGRAM(S): at 09:48

## 2024-11-07 NOTE — BH INPATIENT PSYCHIATRY PROGRESS NOTE - NSBHASSESSSUMMFT_PSY_ALL_CORE
Patient is a 28 y/o woman, singe with no children, she is recently unemployed and is supported by her family; that was admitted to the psychiatric inpatient unit due to disorganized bizarre behaviors in the context of multiple psychosocial stressors and recent cannabis use.   Patient presented during the assessment calm and cooperative. She is a fairly organized and a good historian. Patient reported that a month ago she had to quit her job due to the stress and not been able to cope with it. 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 Patient said that after quitting the job she was more withdrawn, depressed, anxious and isolative. She started writing about when she job sick 3 years ago and she was in to that head space. Patient tells me that she got paranoid, was not sleeping and felt anxious all the time. Patient described her paranoia as feel that people have not the best interest in her, people don't want her to succeed and she cannot trust her friends. Patient denies experiencing any hallucinations and denies SI. Patient has been following with an outpatient psychiatrist in [facility] but they only do telehealth and it was difficult to have her medications adjusted. She doesn't know why she is only on the Effexor.   Patient tells me that when she was 26 she had a similar episode but ended up been fired due to her behaviors at work. She saw a psychiatrist at the time that diagnosed her with Bipolar Disorder and put her on multiple medications. She got worst and ended up coming to the [facility] crisis clinic and she had her medications adjusted but she doesn't know the names of this.  Patient informed me that her father has Bipolar Disorder and her mother has MDD. At this time patient is open to have her medications adjusted and is in agreement with the admission.     Plan:  1. Patient admitted under a voluntary status.  2. Q20 min checks   3. Medications   Continue Effexor 100mg BID  Start Risperdal 1mg at bedtime   Start Lithium 300mg BID   4. Blood work reviewed TSH is elevated; I have call the medical team to address this. Is ok to start Lithium. HbgA1C 5.9 as per medical team we just need to monitor this.  5. Sw to coordinate a safe discharge plan.

## 2024-11-07 NOTE — BH INPATIENT PSYCHIATRY PROGRESS NOTE - NSBHFUPINTERVALHXFT_PSY_A_CORE
Patient has been discuss with the nursing staff. Patient put a 3DL and after talking to the [facility]  she retracted the letter.  Patient today presented as more suspicious, irritable and elevated. Patient was focusing on this writer giving her the diagnosis of Bipolar Disorder. Patient tells me that now she doesn't want to go to the Bipolar Clinic as she doesn't want to be label as bipolar. Patient kept saying that she doesn't know this writer and is not trusting my judgement about her diagnosis. She also said that she doesn't believe that her father has Bipolar Disorder despite him been in treatment and having this diagnosis for years. As per patient every doctor that works with her wants to diagnosed her with Bipolar Disorder. Patient was educated about her symptoms and informed that we gave the diagnosis of Unspecified Mood disorder. I informed the patient that if she doesn't want to go to the Bipolar clinic we have many options that we can choose from and the SW is going to discuss the options that she has.   Patient express today that she feels she is doing better and been in the unit is not benefiting her. She feels distraught by a violent peer. Another peers has been constantly following her and she feels anxious about setting boundaries. Patient reassured that we are trying to set up aftercare and we want for her to feel safe and comfortable. Patient is in agreement with continuing her medications. She is aware that she will be getting a lithium level tomorrow.

## 2024-11-08 LAB
ALBUMIN SERPL ELPH-MCNC: 4.6 G/DL — SIGNIFICANT CHANGE UP (ref 3.3–5)
ALP SERPL-CCNC: 51 U/L — SIGNIFICANT CHANGE UP (ref 40–120)
ALT FLD-CCNC: 31 U/L — SIGNIFICANT CHANGE UP (ref 4–33)
ANION GAP SERPL CALC-SCNC: 17 MMOL/L — HIGH (ref 7–14)
AST SERPL-CCNC: 19 U/L — SIGNIFICANT CHANGE UP (ref 4–32)
BASOPHILS # BLD AUTO: 0.06 K/UL — SIGNIFICANT CHANGE UP (ref 0–0.2)
BASOPHILS NFR BLD AUTO: 1 % — SIGNIFICANT CHANGE UP (ref 0–2)
BILIRUB SERPL-MCNC: 0.5 MG/DL — SIGNIFICANT CHANGE UP (ref 0.2–1.2)
BUN SERPL-MCNC: 11 MG/DL — SIGNIFICANT CHANGE UP (ref 7–23)
CALCIUM SERPL-MCNC: 9.5 MG/DL — SIGNIFICANT CHANGE UP (ref 8.4–10.5)
CHLORIDE SERPL-SCNC: 101 MMOL/L — SIGNIFICANT CHANGE UP (ref 98–107)
CO2 SERPL-SCNC: 20 MMOL/L — LOW (ref 22–31)
CREAT SERPL-MCNC: 0.58 MG/DL — SIGNIFICANT CHANGE UP (ref 0.5–1.3)
EGFR: 126 ML/MIN/1.73M2 — SIGNIFICANT CHANGE UP
EOSINOPHIL # BLD AUTO: 0.21 K/UL — SIGNIFICANT CHANGE UP (ref 0–0.5)
EOSINOPHIL NFR BLD AUTO: 3.4 % — SIGNIFICANT CHANGE UP (ref 0–6)
GLUCOSE SERPL-MCNC: 161 MG/DL — HIGH (ref 70–99)
HCT VFR BLD CALC: 41.4 % — SIGNIFICANT CHANGE UP (ref 34.5–45)
HGB BLD-MCNC: 13.6 G/DL — SIGNIFICANT CHANGE UP (ref 11.5–15.5)
IANC: 3.21 K/UL — SIGNIFICANT CHANGE UP (ref 1.8–7.4)
IMM GRANULOCYTES NFR BLD AUTO: 0.3 % — SIGNIFICANT CHANGE UP (ref 0–0.9)
LITHIUM SERPL-MCNC: 0.3 MMOL/L — LOW (ref 0.6–1.2)
LYMPHOCYTES # BLD AUTO: 2.18 K/UL — SIGNIFICANT CHANGE UP (ref 1–3.3)
LYMPHOCYTES # BLD AUTO: 35.4 % — SIGNIFICANT CHANGE UP (ref 13–44)
MCHC RBC-ENTMCNC: 28.3 PG — SIGNIFICANT CHANGE UP (ref 27–34)
MCHC RBC-ENTMCNC: 32.9 G/DL — SIGNIFICANT CHANGE UP (ref 32–36)
MCV RBC AUTO: 86.3 FL — SIGNIFICANT CHANGE UP (ref 80–100)
MONOCYTES # BLD AUTO: 0.48 K/UL — SIGNIFICANT CHANGE UP (ref 0–0.9)
MONOCYTES NFR BLD AUTO: 7.8 % — SIGNIFICANT CHANGE UP (ref 2–14)
NEUTROPHILS # BLD AUTO: 3.21 K/UL — SIGNIFICANT CHANGE UP (ref 1.8–7.4)
NEUTROPHILS NFR BLD AUTO: 52.1 % — SIGNIFICANT CHANGE UP (ref 43–77)
NRBC # BLD: 0 /100 WBCS — SIGNIFICANT CHANGE UP (ref 0–0)
NRBC # FLD: 0 K/UL — SIGNIFICANT CHANGE UP (ref 0–0)
PLATELET # BLD AUTO: 323 K/UL — SIGNIFICANT CHANGE UP (ref 150–400)
POTASSIUM SERPL-MCNC: 4.1 MMOL/L — SIGNIFICANT CHANGE UP (ref 3.5–5.3)
POTASSIUM SERPL-SCNC: 4.1 MMOL/L — SIGNIFICANT CHANGE UP (ref 3.5–5.3)
PROT SERPL-MCNC: 7.7 G/DL — SIGNIFICANT CHANGE UP (ref 6–8.3)
RBC # BLD: 4.8 M/UL — SIGNIFICANT CHANGE UP (ref 3.8–5.2)
RBC # FLD: 12.3 % — SIGNIFICANT CHANGE UP (ref 10.3–14.5)
SODIUM SERPL-SCNC: 138 MMOL/L — SIGNIFICANT CHANGE UP (ref 135–145)
WBC # BLD: 6.16 K/UL — SIGNIFICANT CHANGE UP (ref 3.8–10.5)
WBC # FLD AUTO: 6.16 K/UL — SIGNIFICANT CHANGE UP (ref 3.8–10.5)

## 2024-11-08 PROCEDURE — 99232 SBSQ HOSP IP/OBS MODERATE 35: CPT

## 2024-11-08 RX ORDER — LITHIUM CARBONATE 300 MG
450 CAPSULE ORAL
Refills: 0 | Status: DISCONTINUED | OUTPATIENT
Start: 2024-11-08 | End: 2024-11-13

## 2024-11-08 RX ADMIN — Medication 300 MILLIGRAM(S): at 09:38

## 2024-11-08 RX ADMIN — Medication 100 MILLIGRAM(S): at 17:18

## 2024-11-08 RX ADMIN — Medication 450 MILLIGRAM(S): at 20:09

## 2024-11-08 RX ADMIN — RISPERIDONE 1 MILLIGRAM(S): 3 TABLET, FILM COATED ORAL at 20:10

## 2024-11-08 RX ADMIN — Medication 1 SPRAY(S): at 17:20

## 2024-11-08 RX ADMIN — Medication 100 MILLIGRAM(S): at 09:37

## 2024-11-08 NOTE — BH SOCIAL WORK INITIAL PSYCHOSOCIAL EVALUATION - NSCMSPTSTRENGTHS_PSY_ALL_CORE
Intact family/Strong support system/Supportive family Expressive of emotions/Intact family/Strong support system/Supportive family

## 2024-11-08 NOTE — BH INPATIENT PSYCHIATRY PROGRESS NOTE - NSBHASSESSSUMMFT_PSY_ALL_CORE
Patient is a 30 y/o woman, singe with no children, she is recently unemployed and is supported by her family; that was admitted to the psychiatric inpatient unit due to disorganized bizarre behaviors in the context of multiple psychosocial stressors and recent cannabis use.   Patient presented during the assessment calm and cooperative. She is a fairly organized and a good historian. Patient reported that a month ago she had to quit her job due to the stress and not been able to cope with it. 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 Patient said that after quitting the job she was more withdrawn, depressed, anxious and isolative. She started writing about when she job sick 3 years ago and she was in to that head space. Patient tells me that she got paranoid, was not sleeping and felt anxious all the time. Patient described her paranoia as feel that people have not the best interest in her, people don't want her to succeed and she cannot trust her friends. Patient denies experiencing any hallucinations and denies SI. Patient has been following with an outpatient psychiatrist in [facility] but they only do telehealth and it was difficult to have her medications adjusted. She doesn't know why she is only on the Effexor.   Patient tells me that when she was 26 she had a similar episode but ended up been fired due to her behaviors at work. She saw a psychiatrist at the time that diagnosed her with Bipolar Disorder and put her on multiple medications. She got worst and ended up coming to the [facility] crisis clinic and she had her medications adjusted but she doesn't know the names of this.  Patient informed me that her father has Bipolar Disorder and her mother has MDD. At this time patient is open to have her medications adjusted and is in agreement with the admission.     Plan:  1. Patient admitted under a voluntary status.  2. Q20 min checks   3. Medications   Continue Effexor 100mg BID  Start Risperdal 1mg at bedtime   Start Lithium 300mg BID   4. Blood work reviewed TSH is elevated; I have call the medical team to address this. Is ok to start Lithium. HbgA1C 5.9 as per medical team we just need to monitor this.  5. Sw to coordinate a safe discharge plan.   Patient is a 30 y/o woman, singe with no children, she is recently unemployed and is supported by her family; that was admitted to the psychiatric inpatient unit due to disorganized bizarre behaviors in the context of multiple psychosocial stressors and recent cannabis use.   Patient presented during the assessment calm and cooperative. She is a fairly organized and a good historian. Patient reported that a month ago she had to quit her job due to the stress and not been able to cope with it. 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 Patient said that after quitting the job she was more withdrawn, depressed, anxious and isolative. She started writing about when she job sick 3 years ago and she was in to that head space. Patient tells me that she got paranoid, was not sleeping and felt anxious all the time. Patient described her paranoia as feel that people have not the best interest in her, people don't want her to succeed and she cannot trust her friends. Patient denies experiencing any hallucinations and denies SI. Patient has been following with an outpatient psychiatrist in [facility] but they only do telehealth and it was difficult to have her medications adjusted. She doesn't know why she is only on the Effexor.   Patient tells me that when she was 26 she had a similar episode but ended up been fired due to her behaviors at work. She saw a psychiatrist at the time that diagnosed her with Bipolar Disorder and put her on multiple medications. She got worst and ended up coming to the [facility] crisis clinic and she had her medications adjusted but she doesn't know the names of this.  Patient informed me that her father has Bipolar Disorder and her mother has MDD. At this time patient is open to have her medications adjusted and is in agreement with the admission.     Plan:  1. Patient admitted under a voluntary status.  2. Q20 min checks   3. Medications   Continue Effexor 100mg BID  Risperdal 1mg at bedtime   Increase Lithium 450mg BID   4. Blood work reviewed TSH is elevated; I have call the medical team to address this. Is ok to start Lithium. HbgA1C 5.9 as per medical team we just need to monitor this.  5. Sw to coordinate a safe discharge plan.

## 2024-11-08 NOTE — BH SOCIAL WORK INITIAL PSYCHOSOCIAL EVALUATION - NSBHSATHC_PSY_A_CORE FT
Pt reports first use age 15, became more consistent in the past month, daily use, and pt reports she chain smokes marijuana in a day since quitting job last month. Pt reports she either rolls joints or uses a bong. Pt's last use was day of admission on Sunday.

## 2024-11-08 NOTE — BH SOCIAL WORK INITIAL PSYCHOSOCIAL EVALUATION - NSBHABUSEPHYSHX_PSY_ALL_CORE
Subjective   Patient ID: Erin Reveles is a 72 y.o. female who is long term resident being seen and evaluated for multiple medical problems.    Debility, weakness, htn         Review of Systems   Reason unable to perform ROS: debility.       Objective   There were no vitals taken for this visit.    Physical Exam  Vitals and nursing note reviewed.   Constitutional:       Appearance: Normal appearance.   HENT:      Head: Normocephalic and atraumatic.      Nose: Nose normal.      Mouth/Throat:      Mouth: Mucous membranes are moist.      Pharynx: Oropharynx is clear.   Eyes:      Extraocular Movements: Extraocular movements intact.      Conjunctiva/sclera: Conjunctivae normal.      Pupils: Pupils are equal, round, and reactive to light.   Cardiovascular:      Rate and Rhythm: Normal rate and regular rhythm.      Pulses: Normal pulses.   Pulmonary:      Effort: Pulmonary effort is normal.      Breath sounds: Normal breath sounds.   Abdominal:      General: Bowel sounds are normal.      Palpations: Abdomen is soft.   Musculoskeletal:         General: Normal range of motion.      Cervical back: Normal range of motion and neck supple.   Skin:     General: Skin is warm and dry.      Capillary Refill: Capillary refill takes less than 2 seconds.      Coloration: Skin is pale.   Neurological:      Mental Status: She is alert. Mental status is at baseline.      Motor: Weakness present.      Coordination: Coordination abnormal.      Gait: Gait abnormal.   Psychiatric:         Mood and Affect: Mood normal.         Behavior: Behavior normal.         Thought Content: Thought content normal.         Judgment: Judgment normal.         Assessment/Plan   Problem List Items Addressed This Visit       Debility - Primary    Weakness    HTN (hypertension)        Goals    None       
No

## 2024-11-08 NOTE — BH SOCIAL WORK INITIAL PSYCHOSOCIAL EVALUATION - DETAILS
Pt reports father with bipolar dx, and mother with MDD and OCD. Pt's maternal grandfather had alcohol use dx and parental grandfather with bipolar dx, paternal grandmother with schizophrenia. Pt's sister has ARMANDO dx. Pt's maternal grandfather with ETOH use dx.

## 2024-11-08 NOTE — BH INPATIENT PSYCHIATRY PROGRESS NOTE - NSBHFUPINTERVALHXFT_PSY_A_CORE
Patient has been discuss with the nursing staff. Patient presented today as brighter and more appropriate. She reported that she is feeling better with the plan and is in agreement with a referral to a dual diagnosis program. She believes that her biggest issue is the use of substances at this time. Patient was educated about the treatment plan and the medication changes. She is in agreement with increasing the dose of Lithium. She was educated about side effects of Lithium, Lithium toxicity, informed she needs to limit caffeine, use only tylenol for pain and needs to continue to keep her self hydrated.     I spoke with father today and review the reasons for admission, labs, medications and how the patient is doing. Father informed me that when the patient was admitted she was hallucinating. There is a strong genetic loading in the family as several family members have   Patient has been discuss with the nursing staff. Patient presented today as brighter and more appropriate. She reported that she is feeling better with the plan and is in agreement with a referral to a dual diagnosis program. She believes that her biggest issue is the use of substances at this time. Patient was educated about the treatment plan and the medication changes. She is in agreement with increasing the dose of Lithium. She was educated about side effects of Lithium, Lithium toxicity, informed she needs to limit caffeine, use only tylenol for pain and needs to continue to keep her self hydrated.     I spoke with father today and review the reasons for admission, labs, medications and how the patient is doing. Father informed me that when the patient was admitted she was hallucinating. There is a strong genetic loading in the family as several family members have bipolar disorder an schizophrenia. Father is in agreement with treatment and tentative discharge plans. Denies any safety concerns.

## 2024-11-09 RX ADMIN — Medication 100 MILLIGRAM(S): at 08:33

## 2024-11-09 RX ADMIN — RISPERIDONE 1 MILLIGRAM(S): 3 TABLET, FILM COATED ORAL at 20:54

## 2024-11-09 RX ADMIN — Medication 450 MILLIGRAM(S): at 20:53

## 2024-11-09 RX ADMIN — Medication 450 MILLIGRAM(S): at 08:33

## 2024-11-09 RX ADMIN — Medication 100 MILLIGRAM(S): at 16:33

## 2024-11-10 RX ADMIN — RISPERIDONE 1 MILLIGRAM(S): 3 TABLET, FILM COATED ORAL at 21:11

## 2024-11-10 RX ADMIN — Medication 100 MILLIGRAM(S): at 16:33

## 2024-11-10 RX ADMIN — Medication 450 MILLIGRAM(S): at 21:11

## 2024-11-10 RX ADMIN — Medication 100 MILLIGRAM(S): at 08:46

## 2024-11-10 RX ADMIN — Medication 450 MILLIGRAM(S): at 08:46

## 2024-11-11 PROCEDURE — 99232 SBSQ HOSP IP/OBS MODERATE 35: CPT

## 2024-11-11 RX ADMIN — Medication 100 MILLIGRAM(S): at 08:35

## 2024-11-11 RX ADMIN — Medication 450 MILLIGRAM(S): at 08:35

## 2024-11-11 RX ADMIN — RISPERIDONE 1 MILLIGRAM(S): 3 TABLET, FILM COATED ORAL at 21:18

## 2024-11-11 RX ADMIN — Medication 450 MILLIGRAM(S): at 21:17

## 2024-11-11 RX ADMIN — Medication 100 MILLIGRAM(S): at 16:37

## 2024-11-11 NOTE — BH INPATIENT PSYCHIATRY PROGRESS NOTE - NSBHFUPINTERVALHXFT_PSY_A_CORE
Patient has been discuss with the nursing staff. Patient presented today as brighter and appropriate. Patient verbalized that she is doing well and denies all psychiatric symptoms. She denies any side effects form the medications and has been able to tolerate the increase on the Lithium. Patient is future oriented and motivated in continuing her treatment in the outpatient setting.

## 2024-11-11 NOTE — BH INPATIENT PSYCHIATRY PROGRESS NOTE - NSBHASSESSSUMMFT_PSY_ALL_CORE
Patient is a 30 y/o woman, singe with no children, she is recently unemployed and is supported by her family; that was admitted to the psychiatric inpatient unit due to disorganized bizarre behaviors in the context of multiple psychosocial stressors and recent cannabis use.   Patient presented during the assessment calm and cooperative. She is a fairly organized and a good historian. Patient reported that a month ago she had to quit her job due to the stress and not been able to cope with it. 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 Patient said that after quitting the job she was more withdrawn, depressed, anxious and isolative. She started writing about when she job sick 3 years ago and she was in to that head space. Patient tells me that she got paranoid, was not sleeping and felt anxious all the time. Patient described her paranoia as feel that people have not the best interest in her, people don't want her to succeed and she cannot trust her friends. Patient denies experiencing any hallucinations and denies SI. Patient has been following with an outpatient psychiatrist in [facility] but they only do telehealth and it was difficult to have her medications adjusted. She doesn't know why she is only on the Effexor.   Patient tells me that when she was 26 she had a similar episode but ended up been fired due to her behaviors at work. She saw a psychiatrist at the time that diagnosed her with Bipolar Disorder and put her on multiple medications. She got worst and ended up coming to the [facility] crisis clinic and she had her medications adjusted but she doesn't know the names of this.  Patient informed me that her father has Bipolar Disorder and her mother has MDD. At this time patient is open to have her medications adjusted and is in agreement with the admission.     Plan:  1. Patient admitted under a voluntary status.  2. Q20 min checks   3. Medications   Continue Effexor 100mg BID  Risperdal 1mg at bedtime   Increase Lithium 450mg BID   4. Blood work reviewed TSH is elevated; I have call the medical team to address this. Is ok to start Lithium. HbgA1C 5.9 as per medical team we just need to monitor this.  5. Sw to coordinate a safe discharge plan.

## 2024-11-12 PROCEDURE — 99232 SBSQ HOSP IP/OBS MODERATE 35: CPT

## 2024-11-12 RX ORDER — VENLAFAXINE HCL 150 MG
1 CAPSULE, EXT RELEASE 24 HR ORAL
Qty: 30 | Refills: 0
Start: 2024-11-12 | End: 2024-11-26

## 2024-11-12 RX ORDER — LITHIUM CARBONATE 300 MG
3 CAPSULE ORAL
Qty: 90 | Refills: 0
Start: 2024-11-12 | End: 2024-11-26

## 2024-11-12 RX ORDER — RISPERIDONE 3 MG/1
1 TABLET, FILM COATED ORAL
Qty: 15 | Refills: 0
Start: 2024-11-12 | End: 2024-11-26

## 2024-11-12 RX ADMIN — RISPERIDONE 1 MILLIGRAM(S): 3 TABLET, FILM COATED ORAL at 20:28

## 2024-11-12 RX ADMIN — Medication 100 MILLIGRAM(S): at 09:08

## 2024-11-12 RX ADMIN — Medication 450 MILLIGRAM(S): at 20:28

## 2024-11-12 RX ADMIN — Medication 100 MILLIGRAM(S): at 17:55

## 2024-11-12 RX ADMIN — Medication 450 MILLIGRAM(S): at 09:09

## 2024-11-12 NOTE — BH INPATIENT PSYCHIATRY DISCHARGE NOTE - NSBHDCRISKMITIGATE_PSY_ALL_CORE
Discussed results with pt. Updated pts pharmacy and let her know we would call in an RX.   Safety planning/Family/Other social support involvement/Medications targeting suicidality/non-suicidal self injurious behavior

## 2024-11-12 NOTE — BH INPATIENT PSYCHIATRY DISCHARGE NOTE - NSBHMETABOLIC_PSY_ALL_CORE_FT
BMI: BMI (kg/m2): 29.4 (11-04-24 @ 01:53)  HbA1c: A1C with Estimated Average Glucose Result: 5.9 % (11-04-24 @ 08:00)    Glucose:   BP: --Vital Signs Last 24 Hrs  T(C): 36.4 (11-12-24 @ 07:23), Max: 36.4 (11-12-24 @ 07:23)  T(F): 97.5 (11-12-24 @ 07:23), Max: 97.5 (11-12-24 @ 07:23)  HR: --  BP: --  BP(mean): --  RR: 18 (11-12-24 @ 07:23) (18 - 18)  SpO2: --    Orthostatic VS  11-12-24 @ 07:23  Lying BP: --/-- HR: --  Sitting BP: 116/75 HR: 81  Standing BP: 102/72 HR: 90  Site: --  Mode: --  Orthostatic VS  11-11-24 @ 07:40  Lying BP: --/-- HR: --  Sitting BP: 104/67 HR: 91  Standing BP: 112/74 HR: 106  Site: --  Mode: --    Lipid Panel: Date/Time: 11-05-24 @ 09:15  Cholesterol, Serum: 211  LDL Cholesterol Calculated: 144  HDL Cholesterol, Serum: 48  Total Cholesterol/HDL Ration Measurement: --  Triglycerides, Serum: 95

## 2024-11-12 NOTE — BH INPATIENT PSYCHIATRY PROGRESS NOTE - NSTXPSYCHOGOAL_PSY_ALL_CORE
Will identify 1 thought/feeling/behavior associated with hallucinations

## 2024-11-12 NOTE — BH INPATIENT PSYCHIATRY PROGRESS NOTE - NSBHASSESSSUMMFT_PSY_ALL_CORE
Patient is a 28 y/o woman, singe with no children, she is recently unemployed and is supported by her family; that was admitted to the psychiatric inpatient unit due to disorganized bizarre behaviors in the context of multiple psychosocial stressors and recent cannabis use.   Patient presented during the assessment calm and cooperative. She is a fairly organized and a good historian. Patient reported that a month ago she had to quit her job due to the stress and not been able to cope with it. 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 Patient said that after quitting the job she was more withdrawn, depressed, anxious and isolative. She started writing about when she job sick 3 years ago and she was in to that head space. Patient tells me that she got paranoid, was not sleeping and felt anxious all the time. Patient described her paranoia as feel that people have not the best interest in her, people don't want her to succeed and she cannot trust her friends. Patient denies experiencing any hallucinations and denies SI. Patient has been following with an outpatient psychiatrist in [facility] but they only do telehealth and it was difficult to have her medications adjusted. She doesn't know why she is only on the Effexor.   Patient tells me that when she was 26 she had a similar episode but ended up been fired due to her behaviors at work. She saw a psychiatrist at the time that diagnosed her with Bipolar Disorder and put her on multiple medications. She got worst and ended up coming to the [facility] crisis clinic and she had her medications adjusted but she doesn't know the names of this.  Patient informed me that her father has Bipolar Disorder and her mother has MDD. At this time patient is open to have her medications adjusted and is in agreement with the admission.     Plan:  1. Patient admitted under a voluntary status.  2. Q20 min checks   3. Medications   Continue Effexor 100mg BID  Risperdal 1mg at bedtime   Increase Lithium 450mg BID   4. Blood work reviewed TSH is elevated; I have call the medical team to address this. Is ok to start Lithium. HbgA1C 5.9 as per medical team we just need to monitor this.  5. Sw to coordinate a safe discharge plan.

## 2024-11-12 NOTE — BH INPATIENT PSYCHIATRY DISCHARGE NOTE - OTHER PAST PSYCHIATRIC HISTORY (INCLUDE DETAILS REGARDING ONSET, COURSE OF ILLNESS, INPATIENT/OUTPATIENT TREATMENT)
in current treatment at [facility]

## 2024-11-12 NOTE — BH INPATIENT PSYCHIATRY DISCHARGE NOTE - NSDCMRMEDTOKEN_GEN_ALL_CORE_FT
lithium 150 mg oral capsule: 3 cap(s) orally 2 times a day  RisperDAL 1 mg oral tablet: 1 tab(s) orally once a day (at bedtime)  venlafaxine 100 mg oral tablet: 1 tab(s) orally 2 times a day (with meals)

## 2024-11-12 NOTE — BH INPATIENT PSYCHIATRY PROGRESS NOTE - NSBHFUPINTERVALCCFT_PSY_A_CORE
"I am better"
"I am feeling disappointed "
"I am good"
"I am upset"
"I am good"
"I am feeling better"

## 2024-11-12 NOTE — BH INPATIENT PSYCHIATRY DISCHARGE NOTE - NSBHASSESSSUMMFT_PSY_ALL_CORE
Patient is a 30 y/o woman, singe with no children, she is recently unemployed and is supported by her family; that was admitted to the psychiatric inpatient unit due to disorganized bizarre behaviors in the context of multiple psychosocial stressors and recent cannabis use.   Patient presented during the assessment calm and cooperative. She is a fairly organized and a good historian. Patient reported that a month ago she had to quit her job due to the stress and not been able to cope with it. 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 Patient said that after quitting the job she was more withdrawn, depressed, anxious and isolative. She started writing about when she job sick 3 years ago and she was in to that head space. Patient tells me that she got paranoid, was not sleeping and felt anxious all the time. Patient described her paranoia as feel that people have not the best interest in her, people don't want her to succeed and she cannot trust her friends. Patient denies experiencing any hallucinations and denies SI. Patient has been following with an outpatient psychiatrist in [facility] but they only do telehealth and it was difficult to have her medications adjusted. She doesn't know why she is only on the Effexor.   Patient tells me that when she was 26 she had a similar episode but ended up been fired due to her behaviors at work. She saw a psychiatrist at the time that diagnosed her with Bipolar Disorder and put her on multiple medications. She got worst and ended up coming to the [facility] crisis clinic and she had her medications adjusted but she doesn't know the names of this.  Patient informed me that her father has Bipolar Disorder and her mother has MDD. At this time patient is open to have her medications adjusted and is in agreement with the admission.     Plan:  1. Discharge patient home today   2. Patient to follow up with outpatient services tomorrow  3. Medications   Continue Effexor 100mg BID  Risperdal 1mg at bedtime   Lithium 450mg BID   4. Blood work reviewed TSH is elevated; I have call the medical team to address this. Is ok to start Lithium. HbgA1C 5.9 as per medical team we just need to monitor this.

## 2024-11-12 NOTE — BH INPATIENT PSYCHIATRY DISCHARGE NOTE - HOSPITAL COURSE
On admission interview, patient presented during the assessment calm and cooperative. She is a fairly organized and a good historian. Patient reported that a month ago she had to quit her job due to the stress and not been able to cope with it. 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 Patient said that after quitting the job she was more withdrawn, depressed, anxious and isolative. She started writing about when she job sick 3 years ago and she was in to that head space. Patient tells me that she got paranoid, was not sleeping and felt anxious all the time. Patient described her paranoia as feel that people have not the best interest in her, people don't want her to succeed and she cannot trust her friends. Patient denies experiencing any hallucinations and denies SI. Patient has been following with an outpatient psychiatrist in [facility] but they only do telehealth and it was difficult to have her medications adjusted. She doesn't know why she is only on the Effexor.   Patient tells me that when she was 26 she had a similar episode but ended up been fired due to her behaviors at work. She saw a psychiatrist at the time that diagnosed her with Bipolar Disorder and put her on multiple medications. She got worst and ended up coming to the [facility] crisis clinic and she had her medications adjusted but she doesn't know the names of this.  Patient informed me that her father has Bipolar Disorder and her mother has MDD. At this time patient is open to have her medications adjusted and is in agreement with the admission.     Patient was started on Lithium that was slowly increased to 450mg BID (current Lithium level is 0.4), Risperdal 1mg at bedtime and we continued with the Effexor 100mg BID. As per collateral and patient's own account it seemed that she has experience manic episodes in the past. She is very opposed to have the diagnosis of Bipolar Disorder but is in agreement with taking the Lithium. Patient was most interested in CD treatment at this time. Patient's parent are supportive of the patient continuing her treatment. Patient was able to improve with this medications. She constantly denied having SI and has been tonny for safety. Patient denied any hallucination and the paranoia has decreased. Patient had some episodes of irritability but she was redirectable. She denies any side effects from the medications, feels it is effective and is in agreement with continuing to take the medications. Patient has been educated about her medications and she is aware about the signs and symptoms of Lithium toxicity.      There were no behavioral problems on the unit.  Patient did not become agitated and did not require emergent intramuscular medications or seclusion/restraints.  Patient did not self-harm on the unit. Patient remained actively engaged in treatment. Patient participated in individual, group, and milieu therapy. Patient got along appropriately with staff and peers. Father was contacted at time of admission to obtain collateral. Safety planning and disposition planning were performed.  Patient did not have any medical problems during this hospitalization.  There were no medical consultations.  Medical team has only recommended for the patient to monitor her TSH and HbGA1C.     On day of discharge, the patient has improved significantly and no longer requires inpatient treatment and care. Patient denies all suicidal and aggressive ideation, intent and plan. Patient displays no psychotic symptoms. Patient is not judged to be an acute danger to self or others at this time. She is stable for transition to outpatient level of care.        Risk Assessment: The patient is at chronic risk of harm to self and others given diagnosis of Bipolar disorder, Anxiety disorder, Insomnia, history of Cannabis and alcohol use disorder in addition to current psychiatric hospitalization.     Protective factors include no access to firearms, parents, career, Christianity values, patient engagement with treatment and desire to obtain treatment (even prior to hospitalization).       On admission the patient was felt to be at an acutely elevated risk of harm to self or others as they were suffering from depression, SI with no plan and psychosis without abortive medication or pharmacotherapy established with the goal of treating/preventing them. Over the hospital course medications were started and patient was set up with after-care plans.     Although patient has an elevated chronic risk of harm to self or others, acute risk has been addressed during inpatient hospitalization. Further hospitalization is no longer warranted. Patient is ready for transition to outpatient care for further management.        Patient will be discharged with the following DSM5 diagnoses:  1.    Unspecified mood disorder      Patient will be discharged on the following medications:  lithium 150 mg oral capsule: 3 cap(s) orally 2 times a day  RisperDAL 1 mg oral tablet: 1 tab(s) orally once a day (at bedtime)  venlafaxine 100 mg oral tablet: 1 tab(s) orally 2 times a day (with meals)        1.    Will d/c home on current regimen. 2. Psychoeducation provided regarding importance of compliance with outpatient appointments and medications. 3. Pt was advised to reduce substance use (MJ/alcohol). 4. Pt was advised to dial 911, return to the ER, or visit the [facility] if they become a danger to self or others or need urgent help.

## 2024-11-12 NOTE — BH INPATIENT PSYCHIATRY PROGRESS NOTE - NSBHMSETHTCONTENT_PSY_A_CORE
Delusions/Ideas of reference/Preoccupations/Ruminations
Preoccupations
Delusions/Ideas of reference/Preoccupations/Ruminations
Preoccupations

## 2024-11-12 NOTE — BH INPATIENT PSYCHIATRY PROGRESS NOTE - CURRENT MEDICATION
MEDICATIONS  (STANDING):  lithium 300 milliGRAM(s) Oral two times a day  risperiDONE   Tablet 1 milliGRAM(s) Oral at bedtime  venlafaxine 100 milliGRAM(s) Oral two times a day with meals    MEDICATIONS  (PRN):  acetaminophen     Tablet .. 650 milliGRAM(s) Oral every 6 hours PRN Mild Pain (1 - 3)  aluminum hydroxide/magnesium hydroxide/simethicone Suspension 30 milliLiter(s) Oral every 4 hours PRN Dyspepsia  LORazepam     Tablet 2 milliGRAM(s) Oral every 6 hours PRN Agitation  LORazepam   Injectable 2 milliGRAM(s) IntraMuscular Once PRN Agitation  melatonin. 5 milliGRAM(s) Oral at bedtime PRN Insomnia  
MEDICATIONS  (STANDING):  lithium 300 milliGRAM(s) Oral two times a day  risperiDONE   Tablet 1 milliGRAM(s) Oral at bedtime  venlafaxine 100 milliGRAM(s) Oral two times a day with meals    MEDICATIONS  (PRN):  acetaminophen     Tablet .. 650 milliGRAM(s) Oral every 6 hours PRN Mild Pain (1 - 3)  aluminum hydroxide/magnesium hydroxide/simethicone Suspension 30 milliLiter(s) Oral every 4 hours PRN Dyspepsia  LORazepam     Tablet 2 milliGRAM(s) Oral every 6 hours PRN Agitation  LORazepam   Injectable 2 milliGRAM(s) IntraMuscular Once PRN Agitation  melatonin. 5 milliGRAM(s) Oral at bedtime PRN Insomnia  
MEDICATIONS  (STANDING):  lithium 450 milliGRAM(s) Oral two times a day  risperiDONE   Tablet 1 milliGRAM(s) Oral at bedtime  venlafaxine 100 milliGRAM(s) Oral two times a day with meals    MEDICATIONS  (PRN):  acetaminophen     Tablet .. 650 milliGRAM(s) Oral every 6 hours PRN Mild Pain (1 - 3)  aluminum hydroxide/magnesium hydroxide/simethicone Suspension 30 milliLiter(s) Oral every 4 hours PRN Dyspepsia  melatonin. 5 milliGRAM(s) Oral at bedtime PRN Insomnia  oxymetazoline 0.05% Nasal Spray 1 Spray(s) Both Nostrils every 12 hours PRN Congestion  polyethylene glycol 3350 17 Gram(s) Oral at bedtime PRN Constipation  
MEDICATIONS  (STANDING):  lithium 450 milliGRAM(s) Oral two times a day  risperiDONE   Tablet 1 milliGRAM(s) Oral at bedtime  venlafaxine 100 milliGRAM(s) Oral two times a day with meals    MEDICATIONS  (PRN):  acetaminophen     Tablet .. 650 milliGRAM(s) Oral every 6 hours PRN Mild Pain (1 - 3)  aluminum hydroxide/magnesium hydroxide/simethicone Suspension 30 milliLiter(s) Oral every 4 hours PRN Dyspepsia  LORazepam     Tablet 2 milliGRAM(s) Oral every 6 hours PRN Agitation  LORazepam   Injectable 2 milliGRAM(s) IntraMuscular Once PRN Agitation  melatonin. 5 milliGRAM(s) Oral at bedtime PRN Insomnia  oxymetazoline 0.05% Nasal Spray 1 Spray(s) Both Nostrils every 12 hours PRN Congestion  polyethylene glycol 3350 17 Gram(s) Oral at bedtime PRN Constipation  
MEDICATIONS  (STANDING):  lithium 450 milliGRAM(s) Oral two times a day  risperiDONE   Tablet 1 milliGRAM(s) Oral at bedtime  venlafaxine 100 milliGRAM(s) Oral two times a day with meals    MEDICATIONS  (PRN):  acetaminophen     Tablet .. 650 milliGRAM(s) Oral every 6 hours PRN Mild Pain (1 - 3)  aluminum hydroxide/magnesium hydroxide/simethicone Suspension 30 milliLiter(s) Oral every 4 hours PRN Dyspepsia  LORazepam     Tablet 2 milliGRAM(s) Oral every 6 hours PRN Agitation  LORazepam   Injectable 2 milliGRAM(s) IntraMuscular Once PRN Agitation  melatonin. 5 milliGRAM(s) Oral at bedtime PRN Insomnia  oxymetazoline 0.05% Nasal Spray 1 Spray(s) Both Nostrils every 12 hours PRN Congestion  polyethylene glycol 3350 17 Gram(s) Oral at bedtime PRN Constipation  
MEDICATIONS  (STANDING):  lithium 300 milliGRAM(s) Oral two times a day  risperiDONE   Tablet 1 milliGRAM(s) Oral at bedtime  venlafaxine 100 milliGRAM(s) Oral two times a day with meals    MEDICATIONS  (PRN):  acetaminophen     Tablet .. 650 milliGRAM(s) Oral every 6 hours PRN Mild Pain (1 - 3)  aluminum hydroxide/magnesium hydroxide/simethicone Suspension 30 milliLiter(s) Oral every 4 hours PRN Dyspepsia  LORazepam     Tablet 2 milliGRAM(s) Oral every 6 hours PRN Agitation  LORazepam   Injectable 2 milliGRAM(s) IntraMuscular Once PRN Agitation  melatonin. 5 milliGRAM(s) Oral at bedtime PRN Insomnia

## 2024-11-12 NOTE — BH INPATIENT PSYCHIATRY PROGRESS NOTE - NSTXPROBSUBMIS_PSY_ALL_CORE
SUBSTANCE MISUSE

## 2024-11-12 NOTE — BH INPATIENT PSYCHIATRY PROGRESS NOTE - NSBHATTESTBILLING_PSY_A_CORE
00527-Ybpvnzmsyp OBS or IP - moderate complexity OR 35-49 mins
31808-Htwhcrfsnr OBS or IP - moderate complexity OR 35-49 mins
20964-Nnuntvppij OBS or IP - moderate complexity OR 35-49 mins
04915-Hexhxmcbuj OBS or IP - moderate complexity OR 35-49 mins
85894-Cznexakwgc OBS or IP - moderate complexity OR 35-49 mins
68770-Yhblxczjxk OBS or IP - moderate complexity OR 35-49 mins

## 2024-11-12 NOTE — BH INPATIENT PSYCHIATRY DISCHARGE NOTE - NSBHFUPINTERVALHXFT_PSY_A_CORE
Patient has been discuss with the nursing staff. Patient presented stable, pleasant and cooperative. She is in agreement with discharging today and reported she is feeling safe going to her parents home. Patient denies any side effects from the medications and believes the medications have been effective. Patient is future oriented and she is looking forward to work with her outpatient provider. Patient has been advised to follow up her PCP as her cholesterol was elevated same as her TSH. Patient verbalized good understanding and was in agreement with this plan.

## 2024-11-12 NOTE — BH INPATIENT PSYCHIATRY PROGRESS NOTE - NSBHCHARTREVIEWVS_PSY_A_CORE FT
Vital Signs Last 24 Hrs  T(C): 36.4 (11-12-24 @ 07:23), Max: 36.4 (11-12-24 @ 07:23)  T(F): 97.5 (11-12-24 @ 07:23), Max: 97.5 (11-12-24 @ 07:23)  HR: --  BP: --  BP(mean): --  RR: 18 (11-12-24 @ 07:23) (18 - 18)  SpO2: --    Orthostatic VS  11-12-24 @ 07:23  Lying BP: --/-- HR: --  Sitting BP: 116/75 HR: 81  Standing BP: 102/72 HR: 90  Site: --  Mode: --  Orthostatic VS  11-11-24 @ 07:40  Lying BP: --/-- HR: --  Sitting BP: 104/67 HR: 91  Standing BP: 112/74 HR: 106  Site: --  Mode: --  
Vital Signs Last 24 Hrs  T(C): --  T(F): --  HR: --  BP: --  BP(mean): --  RR: --  SpO2: --    Orthostatic VS  11-11-24 @ 07:40  Lying BP: --/-- HR: --  Sitting BP: 104/67 HR: 91  Standing BP: 112/74 HR: 106  Site: --  Mode: --  Orthostatic VS  11-10-24 @ 08:09  Lying BP: --/-- HR: --  Sitting BP: 126/66 HR: 79  Standing BP: 123/69 HR: 95  Site: --  Mode: --  
Vital Signs Last 24 Hrs  T(C): 36.3 (11-06-24 @ 07:22), Max: 36.3 (11-06-24 @ 07:22)  T(F): 97.3 (11-06-24 @ 07:22), Max: 97.3 (11-06-24 @ 07:22)  HR: --  BP: --  BP(mean): --  RR: 18 (11-06-24 @ 07:22) (18 - 18)  SpO2: --    Orthostatic VS  11-06-24 @ 07:22  Lying BP: --/-- HR: --  Sitting BP: 117/67 HR: 94  Standing BP: 113/69 HR: 111  Site: --  Mode: --  Orthostatic VS  11-05-24 @ 07:48  Lying BP: --/-- HR: --  Sitting BP: 115/69 HR: 89  Standing BP: 111/64 HR: 98  Site: --  Mode: --  
Vital Signs Last 24 Hrs  T(C): 36.2 (11-05-24 @ 07:48), Max: 36.2 (11-05-24 @ 07:48)  T(F): 97.1 (11-05-24 @ 07:48), Max: 97.1 (11-05-24 @ 07:48)  HR: --  BP: --  BP(mean): --  RR: 18 (11-05-24 @ 07:48) (18 - 18)  SpO2: --    Orthostatic VS  11-05-24 @ 07:48  Lying BP: --/-- HR: --  Sitting BP: 115/69 HR: 89  Standing BP: 111/64 HR: 98  Site: --  Mode: --  Orthostatic VS  11-04-24 @ 08:45  Lying BP: --/-- HR: --  Sitting BP: 124/73 HR: 90  Standing BP: 115/67 HR: 97  Site: --  Mode: --  Orthostatic VS  11-04-24 @ 01:53  Lying BP: --/-- HR: --  Sitting BP: 120/70 HR: 87  Standing BP: 117/66 HR: 96  Site: --  Mode: --  
Vital Signs Last 24 Hrs  T(C): 36.6 (11-08-24 @ 08:20), Max: 36.6 (11-08-24 @ 08:20)  T(F): 97.8 (11-08-24 @ 08:20), Max: 97.8 (11-08-24 @ 08:20)  HR: --  BP: --  BP(mean): --  RR: 18 (11-08-24 @ 08:20) (18 - 18)  SpO2: --    Orthostatic VS  11-08-24 @ 08:20  Lying BP: --/-- HR: --  Sitting BP: 116/70 HR: 94  Standing BP: 118/72 HR: 88  Site: --  Mode: --  Orthostatic VS  11-07-24 @ 08:24  Lying BP: --/-- HR: --  Sitting BP: 113/70 HR: 92  Standing BP: 103/75 HR: 108  Site: --  Mode: --  
Vital Signs Last 24 Hrs  T(C): 36.7 (11-07-24 @ 08:24), Max: 36.7 (11-07-24 @ 08:24)  T(F): 98 (11-07-24 @ 08:24), Max: 98 (11-07-24 @ 08:24)  HR: --  BP: --  BP(mean): --  RR: --  SpO2: --    Orthostatic VS  11-07-24 @ 08:24  Lying BP: --/-- HR: --  Sitting BP: 113/70 HR: 92  Standing BP: 103/75 HR: 108  Site: --  Mode: --  Orthostatic VS  11-06-24 @ 07:22  Lying BP: --/-- HR: --  Sitting BP: 117/67 HR: 94  Standing BP: 113/69 HR: 111  Site: --  Mode: --

## 2024-11-12 NOTE — BH INPATIENT PSYCHIATRY PROGRESS NOTE - PRN MEDS
MEDICATIONS  (PRN):  acetaminophen     Tablet .. 650 milliGRAM(s) Oral every 6 hours PRN Mild Pain (1 - 3)  aluminum hydroxide/magnesium hydroxide/simethicone Suspension 30 milliLiter(s) Oral every 4 hours PRN Dyspepsia  LORazepam     Tablet 2 milliGRAM(s) Oral every 6 hours PRN Agitation  LORazepam   Injectable 2 milliGRAM(s) IntraMuscular Once PRN Agitation  melatonin. 5 milliGRAM(s) Oral at bedtime PRN Insomnia  
MEDICATIONS  (PRN):  acetaminophen     Tablet .. 650 milliGRAM(s) Oral every 6 hours PRN Mild Pain (1 - 3)  aluminum hydroxide/magnesium hydroxide/simethicone Suspension 30 milliLiter(s) Oral every 4 hours PRN Dyspepsia  LORazepam     Tablet 2 milliGRAM(s) Oral every 6 hours PRN Agitation  LORazepam   Injectable 2 milliGRAM(s) IntraMuscular Once PRN Agitation  melatonin. 5 milliGRAM(s) Oral at bedtime PRN Insomnia  oxymetazoline 0.05% Nasal Spray 1 Spray(s) Both Nostrils every 12 hours PRN Congestion  polyethylene glycol 3350 17 Gram(s) Oral at bedtime PRN Constipation  
MEDICATIONS  (PRN):  acetaminophen     Tablet .. 650 milliGRAM(s) Oral every 6 hours PRN Mild Pain (1 - 3)  aluminum hydroxide/magnesium hydroxide/simethicone Suspension 30 milliLiter(s) Oral every 4 hours PRN Dyspepsia  LORazepam     Tablet 2 milliGRAM(s) Oral every 6 hours PRN Agitation  LORazepam   Injectable 2 milliGRAM(s) IntraMuscular Once PRN Agitation  melatonin. 5 milliGRAM(s) Oral at bedtime PRN Insomnia  
MEDICATIONS  (PRN):  acetaminophen     Tablet .. 650 milliGRAM(s) Oral every 6 hours PRN Mild Pain (1 - 3)  aluminum hydroxide/magnesium hydroxide/simethicone Suspension 30 milliLiter(s) Oral every 4 hours PRN Dyspepsia  LORazepam     Tablet 2 milliGRAM(s) Oral every 6 hours PRN Agitation  LORazepam   Injectable 2 milliGRAM(s) IntraMuscular Once PRN Agitation  melatonin. 5 milliGRAM(s) Oral at bedtime PRN Insomnia  oxymetazoline 0.05% Nasal Spray 1 Spray(s) Both Nostrils every 12 hours PRN Congestion  polyethylene glycol 3350 17 Gram(s) Oral at bedtime PRN Constipation  
MEDICATIONS  (PRN):  acetaminophen     Tablet .. 650 milliGRAM(s) Oral every 6 hours PRN Mild Pain (1 - 3)  aluminum hydroxide/magnesium hydroxide/simethicone Suspension 30 milliLiter(s) Oral every 4 hours PRN Dyspepsia  melatonin. 5 milliGRAM(s) Oral at bedtime PRN Insomnia  oxymetazoline 0.05% Nasal Spray 1 Spray(s) Both Nostrils every 12 hours PRN Congestion  polyethylene glycol 3350 17 Gram(s) Oral at bedtime PRN Constipation  
MEDICATIONS  (PRN):  acetaminophen     Tablet .. 650 milliGRAM(s) Oral every 6 hours PRN Mild Pain (1 - 3)  aluminum hydroxide/magnesium hydroxide/simethicone Suspension 30 milliLiter(s) Oral every 4 hours PRN Dyspepsia  LORazepam     Tablet 2 milliGRAM(s) Oral every 6 hours PRN Agitation  LORazepam   Injectable 2 milliGRAM(s) IntraMuscular Once PRN Agitation  melatonin. 5 milliGRAM(s) Oral at bedtime PRN Insomnia

## 2024-11-12 NOTE — BH INPATIENT PSYCHIATRY DISCHARGE NOTE - HPI (INCLUDE ILLNESS QUALITY, SEVERITY, DURATION, TIMING, CONTEXT, MODIFYING FACTORS, ASSOCIATED SIGNS AND SYMPTOMS)
As per ER Notes:    Patient is a 29 year old, female; domicile alone; single; noncaregiver; unemployed ; PPH of depression and anxiety; no hospitalizations; no known suicide attempts; no known history of violence or arrests; active Cannabis abuse; known history of complicated withdrawal; PMH unremarkable; brought in by mother; for psychiatric evaluation.      Patient seen and evaluated. 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 On Saturday patient sent text messages to several friends that did make sense. Patient remembers texting her friends but does not remember what she said. Patient was told her  friends reached out to her mother and patient's mother drove to [place] and brought patient back to her parents home in [place]. Patient then went to her class today in [place] and re turned home to her apartment in [place]. Upon return patient felt extremely anxious and depressed and agreed to come to the hospital. Current depressive symptoms include depressed mood most days of the week, anhedonia ( no longer enjoys socializing), low energy, more concentration and memory and suicidal ideation ( denies plan or intent). Patient also reports excessive anxiety and worry occurring more days than not for at least 6 months, about a number of events or activities and finds it difficult to control the worry.  Anxiety and worry are associated with restlessness', being easily fatigued, difficulty concentrating, racing thoughts. and sleep disturbances.  Patient is in current treatment at  [facility] in [place] and prescribed Venlafaxine 200 mg daily. She reports she is compliant with medication but feels it is not working. Patient reports using Cannabis daily which she feels helps her anxiety. She denies other substances of abuse.  Received collateral from mother, who dove patient to the ED. On Saturday patient was "irrational and texting messages to friends that did not make sense." Patient sister received a message from one of patients friends who was concerned. Mother drove to patient's apartment and brought her back to [place] that evening. Patient was acting bizarre with repetitive speech and not making sense. Mother suspected patient was taking mushrooms, because she has in the past, and thought the effects of the drug would be gone by the morning. This morning patient appeared " 90 percent better" and went to class. Mother went to patient apartment this evening and found patient tearful and smoking cannabis. Patient reported to mother that her instructor told her not to come back to class until she improved mentally and patients friend at school blocked her calls. Patient agreed to come to the ED. Patient has no suicide attempt however on Saturday patient stated that she wanted to die. As far as other psychiatric symptoms, patient reports feeling paranoid with thoughts that random people are out to get her and over the past several days has felt unsafe when she goes outside. Patient denies current or recent auditory/visual hallucinations and denies ideas of reference. Patient also denies

## 2024-11-12 NOTE — BH INPATIENT PSYCHIATRY PROGRESS NOTE - NSBHMETABOLIC_PSY_ALL_CORE_FT
BMI: BMI (kg/m2): 29.4 (11-04-24 @ 01:53)  HbA1c: A1C with Estimated Average Glucose Result: 5.9 % (11-04-24 @ 08:00)    Glucose:   BP: --Vital Signs Last 24 Hrs  T(C): 36.4 (11-12-24 @ 07:23), Max: 36.4 (11-12-24 @ 07:23)  T(F): 97.5 (11-12-24 @ 07:23), Max: 97.5 (11-12-24 @ 07:23)  HR: --  BP: --  BP(mean): --  RR: 18 (11-12-24 @ 07:23) (18 - 18)  SpO2: --    Orthostatic VS  11-12-24 @ 07:23  Lying BP: --/-- HR: --  Sitting BP: 116/75 HR: 81  Standing BP: 102/72 HR: 90  Site: --  Mode: --  Orthostatic VS  11-11-24 @ 07:40  Lying BP: --/-- HR: --  Sitting BP: 104/67 HR: 91  Standing BP: 112/74 HR: 106  Site: --  Mode: --    Lipid Panel: Date/Time: 11-05-24 @ 09:15  Cholesterol, Serum: 211  LDL Cholesterol Calculated: 144  HDL Cholesterol, Serum: 48  Total Cholesterol/HDL Ration Measurement: --  Triglycerides, Serum: 95  
BMI: BMI (kg/m2): 29.4 (11-04-24 @ 01:53)  HbA1c: A1C with Estimated Average Glucose Result: 5.9 % (11-04-24 @ 08:00)    Glucose:   BP: --Vital Signs Last 24 Hrs  T(C): 36.7 (11-07-24 @ 08:24), Max: 36.7 (11-07-24 @ 08:24)  T(F): 98 (11-07-24 @ 08:24), Max: 98 (11-07-24 @ 08:24)  HR: --  BP: --  BP(mean): --  RR: --  SpO2: --    Orthostatic VS  11-07-24 @ 08:24  Lying BP: --/-- HR: --  Sitting BP: 113/70 HR: 92  Standing BP: 103/75 HR: 108  Site: --  Mode: --  Orthostatic VS  11-06-24 @ 07:22  Lying BP: --/-- HR: --  Sitting BP: 117/67 HR: 94  Standing BP: 113/69 HR: 111  Site: --  Mode: --    Lipid Panel: Date/Time: 11-05-24 @ 09:15  Cholesterol, Serum: 211  LDL Cholesterol Calculated: 144  HDL Cholesterol, Serum: 48  Total Cholesterol/HDL Ration Measurement: --  Triglycerides, Serum: 95  
BMI: BMI (kg/m2): 29.4 (11-04-24 @ 01:53)  HbA1c: A1C with Estimated Average Glucose Result: 5.9 % (11-04-24 @ 08:00)    Glucose:   BP: --Vital Signs Last 24 Hrs  T(C): 36.6 (11-08-24 @ 08:20), Max: 36.6 (11-08-24 @ 08:20)  T(F): 97.8 (11-08-24 @ 08:20), Max: 97.8 (11-08-24 @ 08:20)  HR: --  BP: --  BP(mean): --  RR: 18 (11-08-24 @ 08:20) (18 - 18)  SpO2: --    Orthostatic VS  11-08-24 @ 08:20  Lying BP: --/-- HR: --  Sitting BP: 116/70 HR: 94  Standing BP: 118/72 HR: 88  Site: --  Mode: --  Orthostatic VS  11-07-24 @ 08:24  Lying BP: --/-- HR: --  Sitting BP: 113/70 HR: 92  Standing BP: 103/75 HR: 108  Site: --  Mode: --    Lipid Panel: Date/Time: 11-05-24 @ 09:15  Cholesterol, Serum: 211  LDL Cholesterol Calculated: 144  HDL Cholesterol, Serum: 48  Total Cholesterol/HDL Ration Measurement: --  Triglycerides, Serum: 95  
BMI: BMI (kg/m2): 29.4 (11-04-24 @ 01:53)  HbA1c: A1C with Estimated Average Glucose Result: 5.9 % (11-04-24 @ 08:00)    Glucose:   BP: --Vital Signs Last 24 Hrs  T(C): --  T(F): --  HR: --  BP: --  BP(mean): --  RR: --  SpO2: --    Orthostatic VS  11-11-24 @ 07:40  Lying BP: --/-- HR: --  Sitting BP: 104/67 HR: 91  Standing BP: 112/74 HR: 106  Site: --  Mode: --  Orthostatic VS  11-10-24 @ 08:09  Lying BP: --/-- HR: --  Sitting BP: 126/66 HR: 79  Standing BP: 123/69 HR: 95  Site: --  Mode: --    Lipid Panel: Date/Time: 11-05-24 @ 09:15  Cholesterol, Serum: 211  LDL Cholesterol Calculated: 144  HDL Cholesterol, Serum: 48  Total Cholesterol/HDL Ration Measurement: --  Triglycerides, Serum: 95  
BMI: BMI (kg/m2): 29.4 (11-04-24 @ 01:53)  HbA1c: A1C with Estimated Average Glucose Result: 5.9 % (11-04-24 @ 08:00)    Glucose:   BP: 115/76 (11-03-24 @ 23:48) (115/71 - 116/73)Vital Signs Last 24 Hrs  T(C): 36.3 (11-06-24 @ 07:22), Max: 36.3 (11-06-24 @ 07:22)  T(F): 97.3 (11-06-24 @ 07:22), Max: 97.3 (11-06-24 @ 07:22)  HR: --  BP: --  BP(mean): --  RR: 18 (11-06-24 @ 07:22) (18 - 18)  SpO2: --    Orthostatic VS  11-06-24 @ 07:22  Lying BP: --/-- HR: --  Sitting BP: 117/67 HR: 94  Standing BP: 113/69 HR: 111  Site: --  Mode: --  Orthostatic VS  11-05-24 @ 07:48  Lying BP: --/-- HR: --  Sitting BP: 115/69 HR: 89  Standing BP: 111/64 HR: 98  Site: --  Mode: --    Lipid Panel: Date/Time: 11-05-24 @ 09:15  Cholesterol, Serum: 211  LDL Cholesterol Calculated: 144  HDL Cholesterol, Serum: 48  Total Cholesterol/HDL Ration Measurement: --  Triglycerides, Serum: 95  
BMI: BMI (kg/m2): 29.4 (11-04-24 @ 01:53)  HbA1c: A1C with Estimated Average Glucose Result: 5.9 % (11-04-24 @ 08:00)    Glucose:   BP: 115/76 (11-03-24 @ 23:48) (115/71 - 116/73)Vital Signs Last 24 Hrs  T(C): 36.2 (11-05-24 @ 07:48), Max: 36.2 (11-05-24 @ 07:48)  T(F): 97.1 (11-05-24 @ 07:48), Max: 97.1 (11-05-24 @ 07:48)  HR: --  BP: --  BP(mean): --  RR: 18 (11-05-24 @ 07:48) (18 - 18)  SpO2: --    Orthostatic VS  11-05-24 @ 07:48  Lying BP: --/-- HR: --  Sitting BP: 115/69 HR: 89  Standing BP: 111/64 HR: 98  Site: --  Mode: --  Orthostatic VS  11-04-24 @ 08:45  Lying BP: --/-- HR: --  Sitting BP: 124/73 HR: 90  Standing BP: 115/67 HR: 97  Site: --  Mode: --  Orthostatic VS  11-04-24 @ 01:53  Lying BP: --/-- HR: --  Sitting BP: 120/70 HR: 87  Standing BP: 117/66 HR: 96  Site: --  Mode: --    Lipid Panel: Date/Time: 11-05-24 @ 09:15  Cholesterol, Serum: 211  LDL Cholesterol Calculated: 144  HDL Cholesterol, Serum: 48  Total Cholesterol/HDL Ration Measurement: --  Triglycerides, Serum: 95

## 2024-11-12 NOTE — BH INPATIENT PSYCHIATRY PROGRESS NOTE - NSBHFUPINTERVALHXFT_PSY_A_CORE
Patient has been discuss with the nursing staff. Patient presented stable, pleasant and cooperative. Patient has been informed that she is discharging tomorrow and will have her follow up appointment on 11/14. Patient express feeling happy with the news. She is future oriented and is motivated in continuing her treatment. Patient verbalized that she needs to take better care of her self in order to be successful. She is denying all psychiatric symptoms and has been tonny for safety.

## 2024-11-12 NOTE — BH INPATIENT PSYCHIATRY DISCHARGE NOTE - NSDCCPCAREPLAN_GEN_ALL_CORE_FT
PRINCIPAL DISCHARGE DIAGNOSIS  Diagnosis: Mood disorder  Assessment and Plan of Treatment:       SECONDARY DISCHARGE DIAGNOSES  Diagnosis: ARMANDO (generalized anxiety disorder)  Assessment and Plan of Treatment:

## 2024-11-12 NOTE — BH INPATIENT PSYCHIATRY PROGRESS NOTE - NSTXSUBMISINTERMD_PSY_ALL_CORE
Medication management 

## 2024-11-12 NOTE — BH INPATIENT PSYCHIATRY PROGRESS NOTE - NSTXSUBMISGOAL_PSY_ALL_CORE
Be able to acknowledge that substance abuse is a problem

## 2024-11-13 VITALS — TEMPERATURE: 98 F

## 2024-11-13 LAB
ALBUMIN SERPL ELPH-MCNC: 4.5 G/DL — SIGNIFICANT CHANGE UP (ref 3.3–5)
ALP SERPL-CCNC: 53 U/L — SIGNIFICANT CHANGE UP (ref 40–120)
ALT FLD-CCNC: 24 U/L — SIGNIFICANT CHANGE UP (ref 4–33)
ANION GAP SERPL CALC-SCNC: 11 MMOL/L — SIGNIFICANT CHANGE UP (ref 7–14)
AST SERPL-CCNC: 19 U/L — SIGNIFICANT CHANGE UP (ref 4–32)
BILIRUB SERPL-MCNC: 0.2 MG/DL — SIGNIFICANT CHANGE UP (ref 0.2–1.2)
BUN SERPL-MCNC: 9 MG/DL — SIGNIFICANT CHANGE UP (ref 7–23)
CALCIUM SERPL-MCNC: 9.6 MG/DL — SIGNIFICANT CHANGE UP (ref 8.4–10.5)
CHLORIDE SERPL-SCNC: 103 MMOL/L — SIGNIFICANT CHANGE UP (ref 98–107)
CO2 SERPL-SCNC: 25 MMOL/L — SIGNIFICANT CHANGE UP (ref 22–31)
CREAT SERPL-MCNC: 0.62 MG/DL — SIGNIFICANT CHANGE UP (ref 0.5–1.3)
EGFR: 124 ML/MIN/1.73M2 — SIGNIFICANT CHANGE UP
GLUCOSE SERPL-MCNC: 85 MG/DL — SIGNIFICANT CHANGE UP (ref 70–99)
LITHIUM SERPL-MCNC: 0.4 MMOL/L — LOW (ref 0.6–1.2)
POTASSIUM SERPL-MCNC: 4.9 MMOL/L — SIGNIFICANT CHANGE UP (ref 3.5–5.3)
POTASSIUM SERPL-SCNC: 4.9 MMOL/L — SIGNIFICANT CHANGE UP (ref 3.5–5.3)
PROT SERPL-MCNC: 7.3 G/DL — SIGNIFICANT CHANGE UP (ref 6–8.3)
SODIUM SERPL-SCNC: 139 MMOL/L — SIGNIFICANT CHANGE UP (ref 135–145)

## 2024-11-13 RX ADMIN — Medication 100 MILLIGRAM(S): at 09:59

## 2024-11-13 NOTE — BH DISCHARGE NOTE NURSING/SOCIAL WORK/PSYCH REHAB - CAREGIVER ADDRESS
[address] [place] [phone] [address]  [facility] [address]

## 2024-11-13 NOTE — BH DISCHARGE NOTE NURSING/SOCIAL WORK/PSYCH REHAB - NSDCPEWEB_GEN_ALL_CORE
[facility] for Tobacco Control website --- [email or web address] website --- [email or web address]

## 2024-11-13 NOTE — BH DISCHARGE NOTE NURSING/SOCIAL WORK/PSYCH REHAB - NSDCPRGOAL_PSY_ALL_CORE
During this hospitalization Pt made progress towards Psych Rehab goal. Pt was engaged, pleasant and cooperative and intermittently tearful during the session. Pt reported improvements in overall mood. Pt identified deep breathing and sudoku as her coping skills. Pt attended 95% of groups. Pt was fully engaged during groups. Pt was visible and has no behavioral issues in the unit. Pt was observed to have friendly attitude towards a few selected peers.  Pt was compliant with all her meds.

## 2024-11-13 NOTE — BH DISCHARGE NOTE NURSING/SOCIAL WORK/PSYCH REHAB - NSBHDCADDR1FT_A_CORE
[address]  [address] ([address] lot)  [facility] [address]

## 2024-11-13 NOTE — BH DISCHARGE NOTE NURSING/SOCIAL WORK/PSYCH REHAB - NSCDUDCCRISIS_PSY_A_CORE
.[facility] 3 [phone] [phone] ([phone]) Website: www.[employer].org/.National Suicide Prevention Lifeline 2 (915) 970-4968/.  [facility]  1 (417) LIFENET (901-5822)/.  [facility]  [address] [place] [phone]   Hours:  Monday through Friday from 9 AM to 3 PM/988 Suicide and Crisis Lifeline

## 2024-11-13 NOTE — BH DISCHARGE NOTE NURSING/SOCIAL WORK/PSYCH REHAB - NSDCPEEMAIL_GEN_ALL_CORE
[facility] for Tobacco Control email [email or web address]

## 2024-11-13 NOTE — BH DISCHARGE NOTE NURSING/SOCIAL WORK/PSYCH REHAB - PATIENT PORTAL LINK FT
You can access the FollowMyHealth Patient Portal offered by [facility] by registering at the following website: [email or web address]. By joining [employer]’s FollowMyHealth portal, you will also be able to view your health information using other applications (apps) compatible with our system.

## 2024-11-13 NOTE — BH DISCHARGE NOTE NURSING/SOCIAL WORK/PSYCH REHAB - DISCHARGE INSTRUCTIONS AFTERCARE APPOINTMENTS
In order to check the location, date, or time of your aftercare appointment, please refer to your Discharge Instructions Document given to you upon leaving the hospital.  If you have lost the instructions please call [phone]

## 2024-11-13 NOTE — BH DISCHARGE NOTE NURSING/SOCIAL WORK/PSYCH REHAB - NSDCVIVACCINE_GEN_ALL_CORE_FT
Influenza, split virus, trivalent, preservative free; 04-Nov-2024 12:34; Judy Iglesias (RN); [employer]; [id number] (Exp. Date: 01-Jun-2025); IntraMuscular; Deltoid Left.; 0.5 milliLiter(s); VIS (VIS Published: 06-Aug-2021, VIS Presented: 04-Nov-2024);

## 2024-11-14 ENCOUNTER — OUTPATIENT (OUTPATIENT)
Dept: OUTPATIENT SERVICES | Facility: HOSPITAL | Age: 29
LOS: 1 days | Discharge: HOME | End: 2024-11-14

## 2024-11-14 PROBLEM — F32.9 MAJOR DEPRESSIVE DISORDER, SINGLE EPISODE, UNSPECIFIED: Chronic | Status: ACTIVE | Noted: 2024-11-03

## 2024-11-14 PROBLEM — E78.5 HYPERLIPIDEMIA, UNSPECIFIED: Chronic | Status: ACTIVE | Noted: 2024-11-03

## 2024-11-19 ENCOUNTER — TRANSCRIPTION ENCOUNTER (OUTPATIENT)
Age: 29
End: 2024-11-19

## 2024-11-20 PROBLEM — Z00.00 ENCOUNTER FOR PREVENTIVE HEALTH EXAMINATION: Status: ACTIVE | Noted: 2024-11-20

## 2024-12-03 DIAGNOSIS — F10.91 ALCOHOL USE, UNSPECIFIED, IN REMISSION: ICD-10-CM

## 2024-12-03 DIAGNOSIS — F32.9 MAJOR DEPRESSIVE DISORDER, SINGLE EPISODE, UNSPECIFIED: ICD-10-CM

## 2024-12-03 DIAGNOSIS — F12.20 CANNABIS DEPENDENCE, UNCOMPLICATED: ICD-10-CM

## 2024-12-04 DIAGNOSIS — F41.1 GENERALIZED ANXIETY DISORDER: ICD-10-CM

## 2024-12-04 DIAGNOSIS — F39 UNSPECIFIED MOOD [AFFECTIVE] DISORDER: ICD-10-CM

## 2025-02-12 ENCOUNTER — APPOINTMENT (OUTPATIENT)
Dept: FAMILY MEDICINE | Facility: CLINIC | Age: 30
End: 2025-02-12
Payer: MEDICAID

## 2025-02-12 VITALS
HEART RATE: 90 BPM | BODY MASS INDEX: 29.03 KG/M2 | HEIGHT: 69 IN | OXYGEN SATURATION: 98 % | TEMPERATURE: 98.3 F | WEIGHT: 196 LBS | SYSTOLIC BLOOD PRESSURE: 120 MMHG | DIASTOLIC BLOOD PRESSURE: 64 MMHG

## 2025-02-12 DIAGNOSIS — F39 UNSPECIFIED MOOD [AFFECTIVE] DISORDER: ICD-10-CM

## 2025-02-12 DIAGNOSIS — F41.9 ANXIETY DISORDER, UNSPECIFIED: ICD-10-CM

## 2025-02-12 DIAGNOSIS — Z83.3 FAMILY HISTORY OF DIABETES MELLITUS: ICD-10-CM

## 2025-02-12 DIAGNOSIS — Z83.49 FAMILY HISTORY OF OTHER ENDOCRINE, NUTRITIONAL AND METABOLIC DISEASES: ICD-10-CM

## 2025-02-12 DIAGNOSIS — Z80.3 FAMILY HISTORY OF MALIGNANT NEOPLASM OF BREAST: ICD-10-CM

## 2025-02-12 DIAGNOSIS — Z80.0 FAMILY HISTORY OF MALIGNANT NEOPLASM OF DIGESTIVE ORGANS: ICD-10-CM

## 2025-02-12 DIAGNOSIS — Z00.00 ENCOUNTER FOR GENERAL ADULT MEDICAL EXAMINATION W/OUT ABNORMAL FINDINGS: ICD-10-CM

## 2025-02-12 DIAGNOSIS — Z23 ENCOUNTER FOR IMMUNIZATION: ICD-10-CM

## 2025-02-12 DIAGNOSIS — F32.A ANXIETY DISORDER, UNSPECIFIED: ICD-10-CM

## 2025-02-12 DIAGNOSIS — F31.60 BIPOLAR DISORDER, CURRENT EPISODE MIXED, UNSPECIFIED: ICD-10-CM

## 2025-02-12 PROCEDURE — G0008: CPT

## 2025-02-12 PROCEDURE — 90656 IIV3 VACC NO PRSV 0.5 ML IM: CPT

## 2025-02-12 PROCEDURE — 99385 PREV VISIT NEW AGE 18-39: CPT | Mod: 25

## 2025-02-12 RX ORDER — LITHIUM CARBONATE 450 MG/1
450 TABLET ORAL TWICE DAILY
Refills: 0 | Status: ACTIVE | COMMUNITY
Start: 2025-02-12

## 2025-02-12 RX ORDER — VENLAFAXINE HYDROCHLORIDE 225 MG/1
225 TABLET, EXTENDED RELEASE ORAL
Refills: 0 | Status: ACTIVE | COMMUNITY
Start: 2025-02-12

## 2025-02-27 ENCOUNTER — NON-APPOINTMENT (OUTPATIENT)
Age: 30
End: 2025-02-27

## 2025-03-04 ENCOUNTER — APPOINTMENT (OUTPATIENT)
Dept: FAMILY MEDICINE | Facility: CLINIC | Age: 30
End: 2025-03-04

## 2025-03-24 ENCOUNTER — APPOINTMENT (OUTPATIENT)
Dept: OBGYN | Facility: CLINIC | Age: 30
End: 2025-03-24

## 2025-03-31 ENCOUNTER — OUTPATIENT (OUTPATIENT)
Dept: OUTPATIENT SERVICES | Facility: HOSPITAL | Age: 30
LOS: 1 days | Discharge: LEFT BEFORE DISCHARGE | End: 2025-03-31

## 2025-04-09 ENCOUNTER — APPOINTMENT (OUTPATIENT)
Dept: OTOLARYNGOLOGY | Facility: CLINIC | Age: 30
End: 2025-04-09
Payer: MEDICAID

## 2025-04-09 VITALS — WEIGHT: 196 LBS | HEIGHT: 69 IN | BODY MASS INDEX: 29.03 KG/M2

## 2025-04-09 DIAGNOSIS — H93.11 TINNITUS, RIGHT EAR: ICD-10-CM

## 2025-04-09 DIAGNOSIS — H90.3 SENSORINEURAL HEARING LOSS, BILATERAL: ICD-10-CM

## 2025-04-09 DIAGNOSIS — H93.8X3 OTHER SPECIFIED DISORDERS OF EAR, BILATERAL: ICD-10-CM

## 2025-04-09 DIAGNOSIS — H61.23 IMPACTED CERUMEN, BILATERAL: ICD-10-CM

## 2025-04-09 PROCEDURE — 92557 COMPREHENSIVE HEARING TEST: CPT

## 2025-04-09 PROCEDURE — 92567 TYMPANOMETRY: CPT

## 2025-04-09 PROCEDURE — 99204 OFFICE O/P NEW MOD 45 MIN: CPT | Mod: 25
